# Patient Record
Sex: FEMALE | Race: WHITE | NOT HISPANIC OR LATINO | Employment: OTHER | URBAN - METROPOLITAN AREA
[De-identification: names, ages, dates, MRNs, and addresses within clinical notes are randomized per-mention and may not be internally consistent; named-entity substitution may affect disease eponyms.]

---

## 2020-11-13 ENCOUNTER — APPOINTMENT (OUTPATIENT)
Dept: PREADMISSION TESTING | Facility: HOSPITAL | Age: 40
End: 2020-11-13

## 2020-11-13 VITALS — BODY MASS INDEX: 46.14 KG/M2 | WEIGHT: 235.01 LBS | HEIGHT: 60 IN

## 2020-11-13 LAB
DEPRECATED RDW RBC AUTO: 52.4 FL (ref 37–54)
ERYTHROCYTE [DISTWIDTH] IN BLOOD BY AUTOMATED COUNT: 14.6 % (ref 12.3–15.4)
HCT VFR BLD AUTO: 40.8 % (ref 34–46.6)
HGB BLD-MCNC: 13.2 G/DL (ref 12–15.9)
MCH RBC QN AUTO: 31.6 PG (ref 26.6–33)
MCHC RBC AUTO-ENTMCNC: 32.4 G/DL (ref 31.5–35.7)
MCV RBC AUTO: 97.6 FL (ref 79–97)
MRSA DNA SPEC QL NAA+PROBE: NEGATIVE
PLATELET # BLD AUTO: 424 10*3/MM3 (ref 140–450)
PMV BLD AUTO: 10 FL (ref 6–12)
RBC # BLD AUTO: 4.18 10*6/MM3 (ref 3.77–5.28)
SARS-COV-2 RNA RESP QL NAA+PROBE: NOT DETECTED
WBC # BLD AUTO: 11.83 10*3/MM3 (ref 3.4–10.8)

## 2020-11-13 PROCEDURE — 87641 MR-STAPH DNA AMP PROBE: CPT

## 2020-11-13 PROCEDURE — 85027 COMPLETE CBC AUTOMATED: CPT

## 2020-11-13 PROCEDURE — C9803 HOPD COVID-19 SPEC COLLECT: HCPCS

## 2020-11-13 PROCEDURE — 36415 COLL VENOUS BLD VENIPUNCTURE: CPT

## 2020-11-13 PROCEDURE — U0004 COV-19 TEST NON-CDC HGH THRU: HCPCS

## 2020-11-13 RX ORDER — DOCUSATE SODIUM 100 MG/1
300 CAPSULE, LIQUID FILLED ORAL DAILY
COMMUNITY
End: 2022-04-28

## 2020-11-13 RX ORDER — PROMETHAZINE HYDROCHLORIDE 25 MG/1
25 TABLET ORAL 2 TIMES DAILY PRN
COMMUNITY

## 2020-11-13 RX ORDER — OMEPRAZOLE 40 MG/1
40 CAPSULE, DELAYED RELEASE ORAL DAILY
COMMUNITY

## 2020-11-13 RX ORDER — TOPIRAMATE 200 MG/1
2 CAPSULE, EXTENDED RELEASE ORAL DAILY
COMMUNITY
End: 2022-04-28

## 2020-11-13 RX ORDER — FLUOXETINE HYDROCHLORIDE 20 MG/1
60 CAPSULE ORAL DAILY
COMMUNITY
End: 2022-04-28

## 2020-11-13 RX ORDER — BUSPIRONE HYDROCHLORIDE 30 MG/1
30 TABLET ORAL 2 TIMES DAILY
COMMUNITY
End: 2022-12-07

## 2020-11-13 RX ORDER — KETOROLAC TROMETHAMINE 30 MG/ML
30 INJECTION, SOLUTION INTRAMUSCULAR; INTRAVENOUS AS NEEDED
Status: ON HOLD | COMMUNITY
End: 2020-11-16

## 2020-11-13 RX ORDER — ONDANSETRON HYDROCHLORIDE 4 MG/5ML
4 SOLUTION ORAL 2 TIMES WEEKLY
COMMUNITY

## 2020-11-13 RX ORDER — PROPRANOLOL HYDROCHLORIDE 40 MG/1
80 TABLET ORAL 3 TIMES DAILY
COMMUNITY

## 2020-11-13 RX ORDER — LEVOMEFOLATE CALCIUM 15 MG
15 TABLET ORAL DAILY
COMMUNITY

## 2020-11-13 RX ORDER — DIVALPROEX SODIUM 250 MG/1
250 TABLET, EXTENDED RELEASE ORAL NIGHTLY PRN
COMMUNITY
End: 2022-12-07

## 2020-11-13 RX ORDER — PROCHLORPERAZINE MALEATE 10 MG
10 TABLET ORAL EVERY 8 HOURS PRN
COMMUNITY

## 2020-11-13 RX ORDER — CYCLOBENZAPRINE HCL 10 MG
10 TABLET ORAL DAILY PRN
COMMUNITY
End: 2022-12-07

## 2020-11-13 RX ORDER — LAMOTRIGINE 200 MG/1
200 TABLET ORAL DAILY
COMMUNITY
End: 2022-12-07

## 2020-11-13 RX ORDER — PALIPERIDONE 6 MG/1
6 TABLET, EXTENDED RELEASE ORAL EVERY EVENING
COMMUNITY

## 2020-11-15 ENCOUNTER — ANESTHESIA EVENT (OUTPATIENT)
Dept: PERIOP | Facility: HOSPITAL | Age: 40
End: 2020-11-15

## 2020-11-15 RX ORDER — FAMOTIDINE 10 MG/ML
20 INJECTION, SOLUTION INTRAVENOUS ONCE
Status: CANCELLED | OUTPATIENT
Start: 2020-11-15 | End: 2020-11-15

## 2020-11-15 RX ORDER — SODIUM CHLORIDE 0.9 % (FLUSH) 0.9 %
10 SYRINGE (ML) INJECTION AS NEEDED
Status: CANCELLED | OUTPATIENT
Start: 2020-11-15

## 2020-11-15 RX ORDER — SODIUM CHLORIDE 0.9 % (FLUSH) 0.9 %
10 SYRINGE (ML) INJECTION EVERY 12 HOURS SCHEDULED
Status: CANCELLED | OUTPATIENT
Start: 2020-11-15

## 2020-11-16 ENCOUNTER — HOSPITAL ENCOUNTER (OUTPATIENT)
Facility: HOSPITAL | Age: 40
Discharge: HOME | End: 2020-11-17
Attending: ORTHOPAEDIC SURGERY | Admitting: ORTHOPAEDIC SURGERY

## 2020-11-16 ENCOUNTER — APPOINTMENT (OUTPATIENT)
Dept: GENERAL RADIOLOGY | Facility: HOSPITAL | Age: 40
End: 2020-11-16

## 2020-11-16 ENCOUNTER — ANESTHESIA (OUTPATIENT)
Dept: PERIOP | Facility: HOSPITAL | Age: 40
End: 2020-11-16

## 2020-11-16 DIAGNOSIS — M48.02 CERVICAL STENOSIS OF SPINAL CANAL: Primary | ICD-10-CM

## 2020-11-16 PROBLEM — E66.9 OBESITY: Status: ACTIVE | Noted: 2020-11-16

## 2020-11-16 PROBLEM — G43.909 MIGRAINES: Status: ACTIVE | Noted: 2020-11-16

## 2020-11-16 PROBLEM — F41.9 ANXIETY AND DEPRESSION: Status: ACTIVE | Noted: 2020-11-16

## 2020-11-16 PROBLEM — F32.A ANXIETY AND DEPRESSION: Status: ACTIVE | Noted: 2020-11-16

## 2020-11-16 LAB
B-HCG UR QL: NEGATIVE
INTERNAL NEGATIVE CONTROL: NEGATIVE
INTERNAL POSITIVE CONTROL: POSITIVE
Lab: NORMAL

## 2020-11-16 PROCEDURE — 72020 X-RAY EXAM OF SPINE 1 VIEW: CPT

## 2020-11-16 PROCEDURE — 63710000001 DEXAMETHASONE PER 0.25 MG: Performed by: ORTHOPAEDIC SURGERY

## 2020-11-16 PROCEDURE — C1713 ANCHOR/SCREW BN/BN,TIS/BN: HCPCS | Performed by: ORTHOPAEDIC SURGERY

## 2020-11-16 PROCEDURE — 25010000002 FENTANYL CITRATE (PF) 100 MCG/2ML SOLUTION: Performed by: NURSE ANESTHETIST, CERTIFIED REGISTERED

## 2020-11-16 PROCEDURE — 25010000002 DEXAMETHASONE PER 1 MG: Performed by: NURSE ANESTHETIST, CERTIFIED REGISTERED

## 2020-11-16 PROCEDURE — 25010000002 ONDANSETRON PER 1 MG: Performed by: NURSE ANESTHETIST, CERTIFIED REGISTERED

## 2020-11-16 PROCEDURE — 25010000003 CEFAZOLIN IN DEXTROSE 2-4 GM/100ML-% SOLUTION: Performed by: ORTHOPAEDIC SURGERY

## 2020-11-16 PROCEDURE — 25010000002 NEOSTIGMINE 10 MG/10ML SOLUTION: Performed by: NURSE ANESTHETIST, CERTIFIED REGISTERED

## 2020-11-16 PROCEDURE — 81025 URINE PREGNANCY TEST: CPT | Performed by: ORTHOPAEDIC SURGERY

## 2020-11-16 PROCEDURE — 25010000002 HYDROMORPHONE 1 MG/ML SOLUTION: Performed by: ORTHOPAEDIC SURGERY

## 2020-11-16 PROCEDURE — 25010000002 MIDAZOLAM PER 1 MG: Performed by: ANESTHESIOLOGY

## 2020-11-16 PROCEDURE — 25010000002 PROPOFOL 10 MG/ML EMULSION: Performed by: NURSE ANESTHETIST, CERTIFIED REGISTERED

## 2020-11-16 PROCEDURE — 94799 UNLISTED PULMONARY SVC/PX: CPT

## 2020-11-16 PROCEDURE — L0120 CERV FLEX N/ADJ FOAM PRE OTS: HCPCS | Performed by: ORTHOPAEDIC SURGERY

## 2020-11-16 PROCEDURE — 25010000002 HYDROMORPHONE PER 4 MG: Performed by: NURSE ANESTHETIST, CERTIFIED REGISTERED

## 2020-11-16 PROCEDURE — 25810000003 SODIUM CHLORIDE 0.9 % WITH KCL 20 MEQ 20-0.9 MEQ/L-% SOLUTION: Performed by: ORTHOPAEDIC SURGERY

## 2020-11-16 DEVICE — ALLOGRFT BONE VIVIGEN CELLUAR MATRX FORMABLE 1CC: Type: IMPLANTABLE DEVICE | Site: SPINE CERVICAL | Status: FUNCTIONAL

## 2020-11-16 DEVICE — SCRW SKYLINE VAR SD 14MM: Type: IMPLANTABLE DEVICE | Site: SPINE CERVICAL | Status: FUNCTIONAL

## 2020-11-16 DEVICE — HEMOST ABS SURGIFOAM SZ100 8X12 10MM: Type: IMPLANTABLE DEVICE | Site: SPINE CERVICAL | Status: FUNCTIONAL

## 2020-11-16 DEVICE — PLT SKYLINE 1LEVEL 14MM: Type: IMPLANTABLE DEVICE | Site: SPINE CERVICAL | Status: FUNCTIONAL

## 2020-11-16 DEVICE — BENGAL SYSTEM STANDARD IMPLANT 6MM, 7 DEGREES
Type: IMPLANTABLE DEVICE | Site: SPINE CERVICAL | Status: FUNCTIONAL
Brand: BENGAL

## 2020-11-16 RX ORDER — PANTOPRAZOLE SODIUM 40 MG/1
40 TABLET, DELAYED RELEASE ORAL
Status: DISCONTINUED | OUTPATIENT
Start: 2020-11-17 | End: 2020-11-17 | Stop reason: HOSPADM

## 2020-11-16 RX ORDER — ACETAMINOPHEN 325 MG/1
650 TABLET ORAL EVERY 4 HOURS PRN
Status: DISCONTINUED | OUTPATIENT
Start: 2020-11-16 | End: 2020-11-17 | Stop reason: HOSPADM

## 2020-11-16 RX ORDER — FENTANYL CITRATE 50 UG/ML
50 INJECTION, SOLUTION INTRAMUSCULAR; INTRAVENOUS
Status: DISCONTINUED | OUTPATIENT
Start: 2020-11-16 | End: 2020-11-16 | Stop reason: HOSPADM

## 2020-11-16 RX ORDER — ONDANSETRON 2 MG/ML
4 INJECTION INTRAMUSCULAR; INTRAVENOUS EVERY 6 HOURS PRN
Status: DISCONTINUED | OUTPATIENT
Start: 2020-11-16 | End: 2020-11-17 | Stop reason: HOSPADM

## 2020-11-16 RX ORDER — CEFAZOLIN SODIUM 2 G/100ML
2 INJECTION, SOLUTION INTRAVENOUS ONCE
Status: COMPLETED | OUTPATIENT
Start: 2020-11-16 | End: 2020-11-16

## 2020-11-16 RX ORDER — LIDOCAINE HYDROCHLORIDE 10 MG/ML
0.5 INJECTION, SOLUTION EPIDURAL; INFILTRATION; INTRACAUDAL; PERINEURAL ONCE AS NEEDED
Status: COMPLETED | OUTPATIENT
Start: 2020-11-16 | End: 2020-11-16

## 2020-11-16 RX ORDER — SODIUM CHLORIDE 0.9 % (FLUSH) 0.9 %
10 SYRINGE (ML) INJECTION AS NEEDED
Status: DISCONTINUED | OUTPATIENT
Start: 2020-11-16 | End: 2020-11-17 | Stop reason: HOSPADM

## 2020-11-16 RX ORDER — ROCURONIUM BROMIDE 10 MG/ML
INJECTION, SOLUTION INTRAVENOUS AS NEEDED
Status: DISCONTINUED | OUTPATIENT
Start: 2020-11-16 | End: 2020-11-16 | Stop reason: SURG

## 2020-11-16 RX ORDER — FLUOXETINE HYDROCHLORIDE 20 MG/1
60 CAPSULE ORAL DAILY
Status: DISCONTINUED | OUTPATIENT
Start: 2020-11-16 | End: 2020-11-17 | Stop reason: HOSPADM

## 2020-11-16 RX ORDER — ALBUTEROL SULFATE 2.5 MG/3ML
2.5 SOLUTION RESPIRATORY (INHALATION) ONCE AS NEEDED
Status: DISCONTINUED | OUTPATIENT
Start: 2020-11-16 | End: 2020-11-16 | Stop reason: HOSPADM

## 2020-11-16 RX ORDER — MIDAZOLAM HYDROCHLORIDE 1 MG/ML
1 INJECTION INTRAMUSCULAR; INTRAVENOUS
Status: DISCONTINUED | OUTPATIENT
Start: 2020-11-16 | End: 2020-11-16

## 2020-11-16 RX ORDER — ONDANSETRON 2 MG/ML
4 INJECTION INTRAMUSCULAR; INTRAVENOUS ONCE AS NEEDED
Status: DISCONTINUED | OUTPATIENT
Start: 2020-11-16 | End: 2020-11-16 | Stop reason: HOSPADM

## 2020-11-16 RX ORDER — LABETALOL HYDROCHLORIDE 5 MG/ML
5 INJECTION, SOLUTION INTRAVENOUS
Status: DISCONTINUED | OUTPATIENT
Start: 2020-11-16 | End: 2020-11-16 | Stop reason: HOSPADM

## 2020-11-16 RX ORDER — GLYCOPYRROLATE 0.2 MG/ML
INJECTION INTRAMUSCULAR; INTRAVENOUS AS NEEDED
Status: DISCONTINUED | OUTPATIENT
Start: 2020-11-16 | End: 2020-11-16 | Stop reason: SURG

## 2020-11-16 RX ORDER — SODIUM CHLORIDE AND POTASSIUM CHLORIDE 150; 900 MG/100ML; MG/100ML
100 INJECTION, SOLUTION INTRAVENOUS CONTINUOUS
Status: DISCONTINUED | OUTPATIENT
Start: 2020-11-16 | End: 2020-11-17 | Stop reason: HOSPADM

## 2020-11-16 RX ORDER — ALPRAZOLAM 0.25 MG/1
0.25 TABLET ORAL NIGHTLY PRN
Status: DISCONTINUED | OUTPATIENT
Start: 2020-11-16 | End: 2020-11-17 | Stop reason: HOSPADM

## 2020-11-16 RX ORDER — DIHYDROERGOTAMINE MESYLATE 1 MG/ML
1 INJECTION, SOLUTION INTRAMUSCULAR; INTRAVENOUS; SUBCUTANEOUS EVERY 8 HOURS PRN
COMMUNITY

## 2020-11-16 RX ORDER — PROMETHAZINE HYDROCHLORIDE 25 MG/1
25 TABLET ORAL 2 TIMES DAILY PRN
Status: DISCONTINUED | OUTPATIENT
Start: 2020-11-16 | End: 2020-11-17 | Stop reason: HOSPADM

## 2020-11-16 RX ORDER — LIDOCAINE HYDROCHLORIDE 10 MG/ML
INJECTION, SOLUTION EPIDURAL; INFILTRATION; INTRACAUDAL; PERINEURAL AS NEEDED
Status: DISCONTINUED | OUTPATIENT
Start: 2020-11-16 | End: 2020-11-16 | Stop reason: SURG

## 2020-11-16 RX ORDER — PROPOFOL 10 MG/ML
VIAL (ML) INTRAVENOUS AS NEEDED
Status: DISCONTINUED | OUTPATIENT
Start: 2020-11-16 | End: 2020-11-16 | Stop reason: SURG

## 2020-11-16 RX ORDER — FLUORIDE TOOTHPASTE
15 TOOTHPASTE DENTAL
Status: DISCONTINUED | OUTPATIENT
Start: 2020-11-16 | End: 2020-11-17 | Stop reason: HOSPADM

## 2020-11-16 RX ORDER — FENTANYL CITRATE 50 UG/ML
INJECTION, SOLUTION INTRAMUSCULAR; INTRAVENOUS AS NEEDED
Status: DISCONTINUED | OUTPATIENT
Start: 2020-11-16 | End: 2020-11-16 | Stop reason: SURG

## 2020-11-16 RX ORDER — HYDROMORPHONE HYDROCHLORIDE 1 MG/ML
0.5 INJECTION, SOLUTION INTRAMUSCULAR; INTRAVENOUS; SUBCUTANEOUS
Status: DISCONTINUED | OUTPATIENT
Start: 2020-11-16 | End: 2020-11-16 | Stop reason: HOSPADM

## 2020-11-16 RX ORDER — OXYCODONE AND ACETAMINOPHEN 10; 325 MG/1; MG/1
1 TABLET ORAL EVERY 4 HOURS PRN
Status: DISCONTINUED | OUTPATIENT
Start: 2020-11-16 | End: 2020-11-17 | Stop reason: HOSPADM

## 2020-11-16 RX ORDER — DEXAMETHASONE 4 MG/1
4 TABLET ORAL EVERY 6 HOURS SCHEDULED
Status: DISCONTINUED | OUTPATIENT
Start: 2020-11-16 | End: 2020-11-17 | Stop reason: HOSPADM

## 2020-11-16 RX ORDER — FAMOTIDINE 20 MG/1
20 TABLET, FILM COATED ORAL ONCE
Status: COMPLETED | OUTPATIENT
Start: 2020-11-16 | End: 2020-11-16

## 2020-11-16 RX ORDER — PROCHLORPERAZINE MALEATE 5 MG/1
10 TABLET ORAL EVERY 8 HOURS PRN
Status: DISCONTINUED | OUTPATIENT
Start: 2020-11-16 | End: 2020-11-17 | Stop reason: HOSPADM

## 2020-11-16 RX ORDER — PROPRANOLOL HYDROCHLORIDE 20 MG/1
80 TABLET ORAL 3 TIMES DAILY
Status: DISCONTINUED | OUTPATIENT
Start: 2020-11-16 | End: 2020-11-17 | Stop reason: HOSPADM

## 2020-11-16 RX ORDER — DEXAMETHASONE SODIUM PHOSPHATE 4 MG/ML
4 INJECTION, SOLUTION INTRA-ARTICULAR; INTRALESIONAL; INTRAMUSCULAR; INTRAVENOUS; SOFT TISSUE EVERY 6 HOURS SCHEDULED
Status: DISCONTINUED | OUTPATIENT
Start: 2020-11-16 | End: 2020-11-17 | Stop reason: HOSPADM

## 2020-11-16 RX ORDER — SODIUM CHLORIDE 0.9 % (FLUSH) 0.9 %
3 SYRINGE (ML) INJECTION EVERY 12 HOURS SCHEDULED
Status: DISCONTINUED | OUTPATIENT
Start: 2020-11-16 | End: 2020-11-17 | Stop reason: HOSPADM

## 2020-11-16 RX ORDER — CYCLOBENZAPRINE HCL 10 MG
5 TABLET ORAL DAILY PRN
Status: DISCONTINUED | OUTPATIENT
Start: 2020-11-16 | End: 2020-11-17 | Stop reason: HOSPADM

## 2020-11-16 RX ORDER — OXYCODONE HCL 10 MG/1
10 TABLET, FILM COATED, EXTENDED RELEASE ORAL ONCE
Status: COMPLETED | OUTPATIENT
Start: 2020-11-16 | End: 2020-11-16

## 2020-11-16 RX ORDER — NEOSTIGMINE METHYLSULFATE 1 MG/ML
INJECTION, SOLUTION INTRAVENOUS AS NEEDED
Status: DISCONTINUED | OUTPATIENT
Start: 2020-11-16 | End: 2020-11-16 | Stop reason: SURG

## 2020-11-16 RX ORDER — RISPERIDONE 1 MG/1
0.5 TABLET ORAL NIGHTLY
Status: DISCONTINUED | OUTPATIENT
Start: 2020-11-16 | End: 2020-11-17 | Stop reason: HOSPADM

## 2020-11-16 RX ORDER — DOCUSATE SODIUM 100 MG/1
100 CAPSULE, LIQUID FILLED ORAL 2 TIMES DAILY
Status: DISCONTINUED | OUTPATIENT
Start: 2020-11-16 | End: 2020-11-17 | Stop reason: HOSPADM

## 2020-11-16 RX ORDER — DEXAMETHASONE SODIUM PHOSPHATE 4 MG/ML
INJECTION, SOLUTION INTRA-ARTICULAR; INTRALESIONAL; INTRAMUSCULAR; INTRAVENOUS; SOFT TISSUE AS NEEDED
Status: DISCONTINUED | OUTPATIENT
Start: 2020-11-16 | End: 2020-11-16 | Stop reason: SURG

## 2020-11-16 RX ORDER — LABETALOL HYDROCHLORIDE 5 MG/ML
10 INJECTION, SOLUTION INTRAVENOUS EVERY 4 HOURS PRN
Status: DISCONTINUED | OUTPATIENT
Start: 2020-11-16 | End: 2020-11-17 | Stop reason: HOSPADM

## 2020-11-16 RX ORDER — NALOXONE HCL 0.4 MG/ML
0.4 VIAL (ML) INJECTION
Status: DISCONTINUED | OUTPATIENT
Start: 2020-11-16 | End: 2020-11-17 | Stop reason: HOSPADM

## 2020-11-16 RX ORDER — PREGABALIN 75 MG/1
75 CAPSULE ORAL ONCE
Status: COMPLETED | OUTPATIENT
Start: 2020-11-16 | End: 2020-11-16

## 2020-11-16 RX ORDER — FAMOTIDINE 20 MG/1
20 TABLET, FILM COATED ORAL EVERY 12 HOURS SCHEDULED
Status: DISCONTINUED | OUTPATIENT
Start: 2020-11-16 | End: 2020-11-17 | Stop reason: HOSPADM

## 2020-11-16 RX ORDER — LAMOTRIGINE 100 MG/1
200 TABLET ORAL DAILY
Status: DISCONTINUED | OUTPATIENT
Start: 2020-11-16 | End: 2020-11-17 | Stop reason: HOSPADM

## 2020-11-16 RX ORDER — SODIUM CHLORIDE, SODIUM LACTATE, POTASSIUM CHLORIDE, CALCIUM CHLORIDE 600; 310; 30; 20 MG/100ML; MG/100ML; MG/100ML; MG/100ML
9 INJECTION, SOLUTION INTRAVENOUS CONTINUOUS
Status: DISCONTINUED | OUTPATIENT
Start: 2020-11-16 | End: 2020-11-17 | Stop reason: HOSPADM

## 2020-11-16 RX ORDER — MIDAZOLAM HYDROCHLORIDE 1 MG/ML
2 INJECTION INTRAMUSCULAR; INTRAVENOUS ONCE
Status: COMPLETED | OUTPATIENT
Start: 2020-11-16 | End: 2020-11-16

## 2020-11-16 RX ORDER — ONDANSETRON 2 MG/ML
INJECTION INTRAMUSCULAR; INTRAVENOUS AS NEEDED
Status: DISCONTINUED | OUTPATIENT
Start: 2020-11-16 | End: 2020-11-16 | Stop reason: SURG

## 2020-11-16 RX ORDER — SCOLOPAMINE TRANSDERMAL SYSTEM 1 MG/1
1 PATCH, EXTENDED RELEASE TRANSDERMAL CONTINUOUS
Status: ACTIVE | OUTPATIENT
Start: 2020-11-16 | End: 2020-11-17

## 2020-11-16 RX ORDER — ACETAMINOPHEN 500 MG
1000 TABLET ORAL ONCE
Status: COMPLETED | OUTPATIENT
Start: 2020-11-16 | End: 2020-11-16

## 2020-11-16 RX ORDER — HYDROCODONE BITARTRATE AND ACETAMINOPHEN 10; 325 MG/1; MG/1
1 TABLET ORAL EVERY 4 HOURS PRN
Status: DISCONTINUED | OUTPATIENT
Start: 2020-11-16 | End: 2020-11-17 | Stop reason: HOSPADM

## 2020-11-16 RX ORDER — BUSPIRONE HYDROCHLORIDE 10 MG/1
30 TABLET ORAL 2 TIMES DAILY
Status: DISCONTINUED | OUTPATIENT
Start: 2020-11-16 | End: 2020-11-17 | Stop reason: HOSPADM

## 2020-11-16 RX ORDER — TOPIRAMATE 100 MG/1
200 TABLET, FILM COATED ORAL EVERY 12 HOURS SCHEDULED
Status: DISCONTINUED | OUTPATIENT
Start: 2020-11-16 | End: 2020-11-17 | Stop reason: HOSPADM

## 2020-11-16 RX ADMIN — PROPRANOLOL HYDROCHLORIDE 80 MG: 20 TABLET ORAL at 21:25

## 2020-11-16 RX ADMIN — SODIUM CHLORIDE, PRESERVATIVE FREE 3 ML: 5 INJECTION INTRAVENOUS at 21:28

## 2020-11-16 RX ADMIN — DEXAMETHASONE SODIUM PHOSPHATE 8 MG: 4 INJECTION, SOLUTION INTRAMUSCULAR; INTRAVENOUS at 07:50

## 2020-11-16 RX ADMIN — PREGABALIN 75 MG: 75 CAPSULE ORAL at 06:50

## 2020-11-16 RX ADMIN — HYDROMORPHONE HYDROCHLORIDE 0.5 MG: 1 INJECTION, SOLUTION INTRAMUSCULAR; INTRAVENOUS; SUBCUTANEOUS at 10:13

## 2020-11-16 RX ADMIN — RISPERIDONE 0.5 MG: 1 TABLET ORAL at 21:25

## 2020-11-16 RX ADMIN — CEFAZOLIN SODIUM 2 G: 2 INJECTION, SOLUTION INTRAVENOUS at 07:50

## 2020-11-16 RX ADMIN — SODIUM CHLORIDE, POTASSIUM CHLORIDE, SODIUM LACTATE AND CALCIUM CHLORIDE: 600; 310; 30; 20 INJECTION, SOLUTION INTRAVENOUS at 09:28

## 2020-11-16 RX ADMIN — PROPOFOL 200 MG: 10 INJECTION, EMULSION INTRAVENOUS at 07:39

## 2020-11-16 RX ADMIN — FENTANYL CITRATE 50 MCG: 0.05 INJECTION, SOLUTION INTRAMUSCULAR; INTRAVENOUS at 10:03

## 2020-11-16 RX ADMIN — BUSPIRONE HYDROCHLORIDE 30 MG: 10 TABLET ORAL at 11:44

## 2020-11-16 RX ADMIN — SODIUM CHLORIDE, POTASSIUM CHLORIDE, SODIUM LACTATE AND CALCIUM CHLORIDE 9 ML/HR: 600; 310; 30; 20 INJECTION, SOLUTION INTRAVENOUS at 06:49

## 2020-11-16 RX ADMIN — PROPRANOLOL HYDROCHLORIDE 80 MG: 20 TABLET ORAL at 16:10

## 2020-11-16 RX ADMIN — ACETAMINOPHEN 1000 MG: 500 TABLET ORAL at 06:50

## 2020-11-16 RX ADMIN — ROCURONIUM BROMIDE 50 MG: 10 INJECTION INTRAVENOUS at 07:39

## 2020-11-16 RX ADMIN — HYDROCODONE BITARTRATE AND ACETAMINOPHEN 1 TABLET: 10; 325 TABLET ORAL at 14:43

## 2020-11-16 RX ADMIN — POTASSIUM CHLORIDE AND SODIUM CHLORIDE 100 ML/HR: 900; 150 INJECTION, SOLUTION INTRAVENOUS at 10:32

## 2020-11-16 RX ADMIN — FAMOTIDINE 20 MG: 20 TABLET, FILM COATED ORAL at 06:50

## 2020-11-16 RX ADMIN — OXYCODONE HYDROCHLORIDE 10 MG: 10 TABLET, FILM COATED, EXTENDED RELEASE ORAL at 06:50

## 2020-11-16 RX ADMIN — HYDROCODONE BITARTRATE AND ACETAMINOPHEN 1 TABLET: 10; 325 TABLET ORAL at 19:31

## 2020-11-16 RX ADMIN — MIDAZOLAM 2 MG: 1 INJECTION INTRAMUSCULAR; INTRAVENOUS at 07:20

## 2020-11-16 RX ADMIN — GLYCOPYRROLATE 0.4 MG: 0.2 INJECTION INTRAMUSCULAR; INTRAVENOUS at 09:18

## 2020-11-16 RX ADMIN — SCOPALAMINE 1 PATCH: 1 PATCH, EXTENDED RELEASE TRANSDERMAL at 07:20

## 2020-11-16 RX ADMIN — TOPIRAMATE 200 MG: 100 TABLET, FILM COATED ORAL at 14:41

## 2020-11-16 RX ADMIN — OXYCODONE HYDROCHLORIDE AND ACETAMINOPHEN 1 TABLET: 10; 325 TABLET ORAL at 10:55

## 2020-11-16 RX ADMIN — ONDANSETRON 4 MG: 2 INJECTION INTRAMUSCULAR; INTRAVENOUS at 09:18

## 2020-11-16 RX ADMIN — FAMOTIDINE 20 MG: 20 TABLET, FILM COATED ORAL at 21:25

## 2020-11-16 RX ADMIN — LAMOTRIGINE 200 MG: 100 TABLET ORAL at 11:44

## 2020-11-16 RX ADMIN — HYDROMORPHONE HYDROCHLORIDE 1 MG: 1 INJECTION, SOLUTION INTRAMUSCULAR; INTRAVENOUS; SUBCUTANEOUS at 15:55

## 2020-11-16 RX ADMIN — FENTANYL CITRATE 100 MCG: 50 INJECTION, SOLUTION INTRAMUSCULAR; INTRAVENOUS at 07:39

## 2020-11-16 RX ADMIN — FLUOXETINE HYDROCHLORIDE 60 MG: 20 CAPSULE ORAL at 11:44

## 2020-11-16 RX ADMIN — LIDOCAINE HYDROCHLORIDE 50 MG: 10 INJECTION, SOLUTION EPIDURAL; INFILTRATION; INTRACAUDAL; PERINEURAL at 07:39

## 2020-11-16 RX ADMIN — DEXAMETHASONE 4 MG: 4 TABLET ORAL at 23:26

## 2020-11-16 RX ADMIN — MUPIROCIN: 20 OINTMENT TOPICAL at 06:50

## 2020-11-16 RX ADMIN — TOPIRAMATE 200 MG: 100 TABLET, FILM COATED ORAL at 21:26

## 2020-11-16 RX ADMIN — NEOSTIGMINE 3 MG: 1 INJECTION INTRAVENOUS at 09:18

## 2020-11-16 RX ADMIN — FENTANYL CITRATE 50 MCG: 0.05 INJECTION, SOLUTION INTRAMUSCULAR; INTRAVENOUS at 09:55

## 2020-11-16 RX ADMIN — BUSPIRONE HYDROCHLORIDE 30 MG: 10 TABLET ORAL at 21:24

## 2020-11-16 RX ADMIN — DOCUSATE SODIUM 100 MG: 100 CAPSULE ORAL at 11:44

## 2020-11-16 RX ADMIN — LIDOCAINE HYDROCHLORIDE 0.2 ML: 10 INJECTION, SOLUTION EPIDURAL; INFILTRATION; INTRACAUDAL; PERINEURAL at 06:49

## 2020-11-16 RX ADMIN — DEXAMETHASONE 4 MG: 4 TABLET ORAL at 17:41

## 2020-11-16 RX ADMIN — DOCUSATE SODIUM 100 MG: 100 CAPSULE ORAL at 21:24

## 2020-11-16 RX ADMIN — OXYCODONE HYDROCHLORIDE AND ACETAMINOPHEN 1 TABLET: 10; 325 TABLET ORAL at 23:26

## 2020-11-16 RX ADMIN — FAMOTIDINE 20 MG: 20 TABLET, FILM COATED ORAL at 11:44

## 2020-11-16 RX ADMIN — DEXAMETHASONE 4 MG: 4 TABLET ORAL at 11:44

## 2020-11-16 NOTE — PLAN OF CARE
Goal Outcome Evaluation:  Plan of Care Reviewed With: patient  Progress: no change   Pt having pain control issues, pt has had percocet, lortab and dilaudid andc continues to complain of pain 9-10 on the left side, will continue to monitor

## 2020-11-16 NOTE — H&P
Patient Care Team:      Chief complaint neck pain, migraines    Subjective:  Patient is a 39 y.o.female who presents with several year history of migraines and neck pain. Patient states that she has been going to a migraine clinic since 2009 and they did a MRI which revealed cervical spinal stenosis, patient was told her stenosis may be contributing to her migraines. She states that she has pain in her neck which radiates to her right shoulder. Patient denies numbness, tingling or loss of function in her bilateral upper extremities. Patient denies any recent changes to her overall health.     Review of Systems:  General ROS: negative  Cardiovascular ROS: no chest pain or dyspnea on exertion  Respiratory ROS: no cough, shortness of breath, or wheezing      Allergies: No Known Allergies       Latex: negative  Contrast Dye: negative    Home Meds    Medications Prior to Admission   Medication Sig Dispense Refill Last Dose   • busPIRone (BUSPAR) 30 MG tablet Take 30 mg by mouth 2 (Two) Times a Day.   11/15/2020 at Unknown time   • cyclobenzaprine (FLEXERIL) 10 MG tablet Take 10 mg by mouth Daily As Needed for Muscle Spasms. 1/2 TO 1 TABLET AS NEEDED FOR NECK PAIN   11/15/2020 at Unknown time   • docusate sodium (COLACE) 100 MG capsule Take 300 mg by mouth Daily.   11/15/2020 at Unknown time   • FLUoxetine (PROzac) 20 MG capsule Take 60 mg by mouth Daily.   11/15/2020 at Unknown time   • l-methylfolate 15 MG tablet tablet Take 15 mg by mouth Daily.   11/15/2020 at Unknown time   • lamoTRIgine (LaMICtal) 200 MG tablet Take 200 mg by mouth Daily.   11/15/2020 at Unknown time   • omeprazole (priLOSEC) 40 MG capsule Take 40 mg by mouth Daily.   11/15/2020 at Unknown time   • paliperidone (INVEGA) 6 MG 24 hr tablet Take 6 mg by mouth Every Evening.   11/15/2020 at Unknown time   • prochlorperazine (COMPAZINE) 10 MG tablet Take 10 mg by mouth Every 8 (Eight) Hours As Needed for Nausea or Vomiting (HA).   11/15/2020 at Unknown  time   • propranolol (INDERAL) 40 MG tablet Take 80 mg by mouth 3 (Three) Times a Day.   11/15/2020 at 1900   • Topiramate ER (Trokendi XR) 200 MG capsule sustained-release 24 hr Take 2 capsules by mouth Daily.   11/15/2020 at Unknown time   • dihydroergotamine (D.H.E. 45) 1 MG/ML injection Infuse 1 mg into a venous catheter Every 8 (Eight) Hours As Needed for Migraine.   More than a month at Unknown time   • dihydroergotamine in sodium chloride 0.9 % 50 mL IVPB Infuse 1 mg into a venous catheter As Needed.   11/2/2020   • diphenhydrAMINE (BENADRYL) Infuse 25 mg into a venous catheter 2 (Two) Times a Week.   11/2/2020   • divalproex (DEPAKOTE) 250 MG 24 hr tablet Take 250 mg by mouth At Night As Needed (HA).   11/2/2020   • ketorolac (TORADOL) 30 MG/ML injection Infuse 30 mg into a venous catheter As Needed for Moderate Pain .   11/2/2020   • OnabotulinumtoxinA (BOTOX IJ) Inject 1 each as directed Every 3 (Three) Months.   8/11/2020   • ondansetron (ZOFRAN) 4 MG/5ML solution Take 4 mg by mouth 2 (Two) Times a Week.   More than a month at Unknown time   • promethazine (PHENERGAN) 25 MG tablet Take 25 mg by mouth 2 (Two) Times a Day As Needed for Nausea or Vomiting.   11/13/2020   • promethazine (PHENERGAN) Infuse 12.5 mg into a venous catheter 2 (Two) Times a Week.   11/2/2020   • valproate in sodium chloride 0.9 % 100 mL IVPB Infuse 500 mg into a venous catheter 2 (Two) Times a Week.   11/2/2020     PMH:   Past Medical History:   Diagnosis Date   • Anxiety and depression    • Constipation    • Contact lens/glasses fitting    • Migraines    • PONV (postoperative nausea and vomiting)    • Spinal headache      PSH:    Past Surgical History:   Procedure Laterality Date   • CHOLECYSTECTOMY     • TIBIA FRACTURE SURGERY      HARDWARE IN ANKLE     Immunization History: pneumo negative   Flu negative  Tetanus up to date  Social History:   Tobacco negative   Alcohol negative       Physical Exam:/70 (BP Location: Right  arm, Patient Position: Lying)   Pulse 79   Temp 98.6 °F (37 °C) (Tympanic)   Resp 18   SpO2 97%   Breastfeeding No Comment: neg HcG      General Appearance:    Alert, cooperative, no distress, appears stated age   Head:    Normocephalic, without obvious abnormality, atraumatic   Lungs:     Clear to auscultation bilaterally, respirations unlabored    Heart:   Regular rate and rhythm, S1 and S2 normal, no murmur, rub    or gallop    Abdomen:    Soft without tenderness   Breast Exam:    deferred   Genitalia:    deferred   Extremities:   Extremities normal, atraumatic, no cyanosis or edema   Skin:   Skin color, texture, turgor normal, no rashes or lesions   Neurologic:   Grossly intact     Results Review:   LABS:  Lab Results   Component Value Date    WBC 11.83 (H) 11/13/2020    HGB 13.2 11/13/2020    HCT 40.8 11/13/2020    MCV 97.6 (H) 11/13/2020     11/13/2020    NEUTROABS 6.19 03/25/2015    GLUCOSE 76 03/25/2015    BUN 9 03/25/2015    CREATININE 0.8 03/25/2015     03/25/2015    K 3.4 (L) 03/25/2015     03/25/2015    CO2 23 03/25/2015    CALCIUM 9.3 03/25/2015    ALBUMIN 4.4 03/25/2015    AST 26 03/25/2015    ALT 42 (H) 03/25/2015    BILITOT 0.3 03/25/2015       RADIOLOGY:  Imaging Results (Last 72 Hours)     ** No results found for the last 72 hours. **          Impression: cervical stenosis    Plan: anterior cervical discectomy and fusion C5-6 with plate, cage and allograft   Charlene Gamino PA-C 11/16/2020 06:55 EST

## 2020-11-16 NOTE — ANESTHESIA PREPROCEDURE EVALUATION
Anesthesia Evaluation     Patient summary reviewed and Nursing notes reviewed   history of anesthetic complications: PONV  NPO Solid Status: > 8 hours  NPO Liquid Status: > 2 hours           Airway   Mallampati: II  TM distance: >3 FB  Neck ROM: full  No difficulty expected  Dental - normal exam     Pulmonary - negative pulmonary ROS and normal exam   (-) COPD, asthma, sleep apnea, not a smoker  Cardiovascular - normal exam  Exercise tolerance: good (4-7 METS)    ECG reviewed    (-) hypertension, dysrhythmias, angina, CHF, orthopnea, ORELLANA, hyperlipidemia      Neuro/Psych  (+) headaches, psychiatric history Anxiety,     (-) seizures, CVA  GI/Hepatic/Renal/Endo    (+) obesity,  GERD well controlled,  liver disease fatty liver disease,   (-) no renal disease, diabetes    Musculoskeletal (-) negative ROS    Abdominal    Substance History      OB/GYN          Other - negative ROS                       Anesthesia Plan    ASA 3     general     intravenous induction     Anesthetic plan, all risks, benefits, and alternatives have been provided, discussed and informed consent has been obtained with: patient.    Plan discussed with CRNA.

## 2020-11-16 NOTE — PROGRESS NOTES
"Ortho Spine Progress Note     LOS: 0 days   Patient Care Team:  Tato Spap MD as PCP - General (Family Medicine)    Subjective Pt with some incisional pain and posterior neck pain, typical pain after an ACDF, but perhaps more severe in her than typical. No arm pain or numbness.    Objective     Vital Signs:  /79 (BP Location: Right arm, Patient Position: Lying)   Pulse 99   Temp 98.1 °F (36.7 °C) (Oral)   Resp 18   Ht 152.4 cm (60\")   Wt 107 kg (235 lb 0.2 oz)   SpO2 96%   Breastfeeding No Comment: neg HcG  BMI 45.90 kg/m²          Labs:  Lab Results (last 24 hours)     Procedure Component Value Units Date/Time    POC Pregnancy, Urine [[id number]]  (Normal) Collected: 11/16/20 0644    Specimen: Urine Updated: 11/16/20 0644     HCG, Urine, QL Negative     Lot Number [id number]     Internal Positive Control Positive     Internal Negative Control Negative          Physical Exam:  Neurovascular intact.  Calves soft, non-tender.     Assessment/Plan   Doing OK after surgery. Typical type of pain after ACDF, just a bit more severe. Need to mobilize after dinner tonight. Anticipate home tomorrow.    Tato Riley MD  11/16/20  16:54 EST      "

## 2020-11-16 NOTE — ANESTHESIA POSTPROCEDURE EVALUATION
Patient: Leticia Farooq    Procedure Summary     Date: 11/16/20 Room / [facility]:  [facility]    Anesthesia Start: 0734 Anesthesia Stop:     Procedure: ANTERIOR CERVICAL DISCECTOMY AND FUSION C 5-6 WITH PLATE, CAGE AND ALLOGRAFT (N/A Spine Cervical) Diagnosis:     Surgeon: Tato Riley MD Provider: Elpidio Carlos Jr., MD    Anesthesia Type: general ASA Status: 3          Anesthesia Type: general    Vitals  Vitals Value Taken Time   BP     Temp     Pulse     Resp     SpO2 93 % 11/16/20 0934   Vitals shown include unvalidated device data.        Post Anesthesia Care and Evaluation    Patient location during evaluation: PACU  Patient participation: complete - patient participated  Level of consciousness: awake  Pain score: 0  Pain management: adequate  Airway patency: patent  Anesthetic complications: No anesthetic complications  PONV Status: none  Cardiovascular status: acceptable and stable  Respiratory status: nasal cannula, unassisted, acceptable and spontaneous ventilation  Hydration status: acceptable

## 2020-11-16 NOTE — H&P
Patient Name: Leticia Farooq  MRN: [id number]  : 1980  DOS: 2020    Attending: Tato Riley MD    Primary Care Provider: Tato Sapp MD      Chief complaint: Neck pain and migraines    Subjective   Patient is a pleasant 39 y.o. female presented with for scheduled surgery by Dr. Riley.   She has been diagnosed with cervical spinal stenosis and opted for surgical intervention.  She has had chronic migraines since , has been following migraine clinic, is on extensive regimen of medications for her migraines.  Today she underwent the following procedures under general anesthesia:  #1  Anterior Cervical Discectomy and Fusion of  C5-6                          #2  Interbody Cage Placed at  C5-6 (Depuy Bengal Cage which does not have an integral plate.)                          #3  Anterior Cervical Plate (independent of the cage) Spanning  C5-6   She tolerated surgery well and is admitted for further management.  Seen in her room postoperatively, doing fairly well, controlled postop pain, no nausea or vomiting.  She has not yet ambulated.    She has no history of DVT or PE.  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    Allergies:  No Known Allergies    Meds:  Medications Prior to Admission   Medication Sig Dispense Refill Last Dose   • busPIRone (BUSPAR) 30 MG tablet Take 30 mg by mouth 2 (Two) Times a Day.   11/15/2020 at Unknown time   • cyclobenzaprine (FLEXERIL) 10 MG tablet Take 10 mg by mouth Daily As Needed for Muscle Spasms. 1/2 TO 1 TABLET AS NEEDED FOR NECK PAIN   11/15/2020 at Unknown time   • docusate sodium (COLACE) 100 MG capsule Take 300 mg by mouth Daily.   11/15/2020 at Unknown time   • FLUoxetine (PROzac) 20 MG capsule Take 60 mg by mouth Daily.   11/15/2020 at Unknown time   • l-methylfolate 15 MG tablet tablet Take 15 mg by mouth Daily.   11/15/2020 at Unknown time   • lamoTRIgine (LaMICtal) 200 MG tablet Take  200 mg by mouth Daily.   11/15/2020 at Unknown time   • omeprazole (priLOSEC) 40 MG capsule Take 40 mg by mouth Daily.   11/15/2020 at Unknown time   • paliperidone (INVEGA) 6 MG 24 hr tablet Take 6 mg by mouth Every Evening.   11/15/2020 at Unknown time   • prochlorperazine (COMPAZINE) 10 MG tablet Take 10 mg by mouth Every 8 (Eight) Hours As Needed for Nausea or Vomiting (HA).   11/15/2020 at Unknown time   • propranolol (INDERAL) 40 MG tablet Take 80 mg by mouth 3 (Three) Times a Day.   11/15/2020 at 1900   • Topiramate ER (Trokendi XR) 200 MG capsule sustained-release 24 hr Take 2 capsules by mouth Daily.   11/15/2020 at Unknown time   • dihydroergotamine (D.H.E. 45) 1 MG/ML injection Infuse 1 mg into a venous catheter Every 8 (Eight) Hours As Needed for Migraine.   More than a month at Unknown time   • dihydroergotamine in sodium chloride 0.9 % 50 mL IVPB Infuse 1 mg into a venous catheter As Needed.   11/2/2020   • diphenhydrAMINE (BENADRYL) Infuse 25 mg into a venous catheter 2 (Two) Times a Week.   11/2/2020   • divalproex (DEPAKOTE) 250 MG 24 hr tablet Take 250 mg by mouth At Night As Needed (HA).   11/2/2020   • OnabotulinumtoxinA (BOTOX IJ) Inject 1 each as directed Every 3 (Three) Months.   8/11/2020   • ondansetron (ZOFRAN) 4 MG/5ML solution Take 4 mg by mouth 2 (Two) Times a Week.   More than a month at Unknown time   • promethazine (PHENERGAN) 25 MG tablet Take 25 mg by mouth 2 (Two) Times a Day As Needed for Nausea or Vomiting.   11/13/2020   • promethazine (PHENERGAN) Infuse 12.5 mg into a venous catheter 2 (Two) Times a Week.   11/2/2020   • valproate in sodium chloride 0.9 % 100 mL IVPB Infuse 500 mg into a venous catheter 2 (Two) Times a Week.   11/2/2020         History:   Past Medical History:   Diagnosis Date   • Anxiety and depression    • Constipation    • Contact lens/glasses fitting    • Migraines    • PONV (postoperative nausea and vomiting)    • Spinal headache      Past Surgical  "History:   Procedure Laterality Date   • CHOLECYSTECTOMY     • TIBIA FRACTURE SURGERY      HARDWARE IN ANKLE     History reviewed. No pertinent family history.  Social History     Tobacco Use   • Smoking status: Never Smoker   • Smokeless tobacco: Never Used   Substance Use Topics   • Alcohol use: Never     Frequency: Never   • Drug use: Never   , has 3 children.  Disabled.     Review of Systems  Pertinent items are noted in HPI, all other systems reviewed and negative    Vital Signs  /79 (BP Location: Right arm, Patient Position: Lying)   Pulse 85   Temp 98.1 °F (36.7 °C) (Oral)   Resp 20   Ht 152.4 cm (60\")   Wt 107 kg (235 lb 0.2 oz)   SpO2 93%   Breastfeeding No Comment: neg HcG  BMI 45.90 kg/m²     Physical Exam:    General Appearance:    Alert, cooperative, in no acute distress   Head:    Normocephalic, without obvious abnormality, atraumatic   Eyes:            Lids and lashes normal, conjunctivae and sclerae normal, no   icterus, no pallor, corneas clear,   Neck: Soft c-collar on, dressing clean dry intact, drain present.   Ears:    Ears appear intact with no abnormalities noted   Throat:   No oral lesions, no thrush, oral mucosa moist   Neck:   No adenopathy, supple, trachea midline, no thyromegaly         Lungs:     Clear to auscultation,respirations regular, even and                   unlabored    Heart:    Regular rhythm and normal rate, normal S1 and S2, no            murmur, no gallop   Abdomen:     Normal bowel sounds, no masses, no organomegaly, soft        non-tender, non-distended, no guarding, no rebound                 tenderness   Genitalia:    Deferred   Extremities:   Moves all extremities well, no edema, no cyanosis, no              redness   Pulses:   Pulses palpable and equal bilaterally   Skin:   No bleeding, bruising or rash   Neurologic:   Cranial nerves 2 - 12 grossly intact, no gross motor or sensory deficit upper or lower extremities.      I reviewed the patient's " new clinical results.       Results from last 7 days   Lab Units 11/13/20  1237   WBC 10*3/mm3 11.83*   HEMOGLOBIN g/dL 13.2   HEMATOCRIT % 40.8   PLATELETS 10*3/mm3 424           Invalid input(s): LABALBU, PROT  No results found for: HGBA1C        Assessment and Plan:       Cervical stenosis of spinal canal, s/p ACDF    Migraines    Anxiety and depression  Obesity    Plan  1. PT/OT, ambulate  2. Pain control-prns   3. IS-encourage  4. DVT proph- mechanicals, ambulate  5. Bowel regimen  6. Resume home medications as appropriate  7. Monitor post-op labs  8. DC planning for for home, likely postop day 1.    I reviewed with patient and her  her home medications, resume these as appropriate, formulary substitution for Trokendi and extended release Topamax.    For anxiety I will add low-dose Xanax twice daily as needed.      Dragon disclaimer:  Part of this encounter note is an electronic transcription/translation of spoken language to printed text. The electronic translation of spoken language may permit erroneous, or at times, nonsensical words or phrases to be inadvertently transcribed; Although I have reviewed the note for such errors, some may still exist.    Sandy Decker MD  11/16/20  11:12 EST

## 2020-11-16 NOTE — OP NOTE
[facility] OPERATIVE NOTE    Pre-Operative Diagnosis:  Cervical Stenosis C5-6    Post Operative Diagnosis:  Same    Procedure: #1  Anterior Cervical Discectomy and Fusion of  C5-6    #2  Interbody Cage Placed at  C5-6 (Depuy Bengal Cage which does not have an integral plate.)    #3  Anterior Cervical Plate (independent of the cage) Spanning  C5-6     Surgeon:  Tato Riley MD    First Assistant:  JR Jimi FREEMAN  to assist with retraction, suction, visualization and implant placement.    Anesthesia:  General    EBL:  5-10cc    Drain:  Hemovac to bulb suction    Indications:  Patient presents as a 40 yo female with neck pain and headaches for years. Right posterior shoulder and arm pain for several months. Failed conservative treatment including trial of CRISTA.   Risks, benefits and both operative and non operative options were discussed with the patient preoperatively.  Specific risks including, but not limited to the risk of bleeding, infection, nerve injury, blood vessel injury, weakness, paralysis and possible worsening of pain were discussed.   After informed consent and antibiotic prophylaxis, patient brought to the operating room.  After general endotracheal anesthesia patient was positioned supine with a roll under the shoulder blades and the neck in slight extension. Head held in Philpot horsehoe head parker. Arms were tucked and taped at the side.  The bony prominences were well padded.  Neck was prepped and draped in usual sterile fashion.    Description:  Incision made to the left of midline approximately 2 fingerbreadths above the clavicle in a skin crease. Sharp dissection through the platysma. Hemostasis obtained with pickups and Bovie. Platysma was elevated cephalad and caudal for a couple of centimeters. Blunt fingertip dissection anterior to the sternocleidomastoid led readily to the anterior cervical spine. Care taken to identify palpable carotid pulse, dissection medial to this, omohyoid was  identified, retracted caudally to the other side.  Handheld retractors were used throughout. Prevertebral fascia thinned with Kittner. I identified the disk space immediately above the most prominent palpable parotid tubercle, marked it with Bovie and then advanced spinal needle, this was the C4-5 level. The prevertebral fascia was elevated subperiosteally for the disk space below this marked level, therefore only the C5-6 surgical level was exposed. The inferior half of C5 and superior half of C6 were exposed with subperiosteal dissection with the Bovie elevating the longus colli to the right and to the left. Anterior annulotomy performed at C5-6 with a #15 scalpel, disk material removed with pituitary followed by 0 and #1 curettes. Overhanging osteophyte from the vertebral body of C5 was removed with the high speed all and disk material then continued to be removed with 0 and #1 curette all the way back to the posterior longitudinal ligament. Posterior osteophytes were thinned with high speed all, followed by the #1 curette and in the right foramen 3-0 curved curette was used to sweep behind the vertebral bodies of C5 and C6 on the right side to complete foraminotomy. At completion I could swing the 3-0 curved curette through the right-sided foramen. No remaining osteophytes were present. Complete foraminotomy had been performed. High speed all was used to lightly decorticate the endplates of C5 and C6, then trials were used to 6 mm lordotic trial which did achieve additional distraction, care taken not to over-distract this level. A 6 mm cage was selected, packed with purified allograft (Vivigen) and tapped into the disk space. Good distraction was achieved. The 10 pounds of head halter traction which had been used during diskectomy and cage placement was now removed. An independent plate was selected, the cage itself does not have an integral plate. The selected single level plate was placed, the small  anterior osteophytes had already been removed, traction was removed, the screws were placed sequentially with a handheld 12 mm automatic stop drill bit, 14 mm screws were placed sequentially. Very good purchase was achieved in all 4 screws. They were tightened sequentially, then the locking mechanism was rotated over top of the screw head and locking was visually confirmed for all 4 screws. X-ray was taken cross table lateral, showed C5 well and showed the cage in the disk space fairly well. The screws in C6 were not well see but they were visualized intraoperatively. X-ray was limited by patient's body habitus. X-ray confirmed appropriate level of C5-C6 and appropriate length of the screws in C5 as well as the depth of the cage. The wound was thoroughly irrigated. Drain brought out through a separate stab incision and stitched in place. Layered closure performed with interrupted 2-0 Vicryl in the platysma. Interrupted inverted 2-0 in the subcuticular layer followed by 3-0 subcuticular skin stitch. Mastisol, Steri-Strips, and a soft cervical collar with extension applied. Patient transferred back to the stretcher. There were no intraoperative complications.    The postprocedure plan is for observation overnight. Rapid mobilization with nursing assistance. Physical therapy as necessary. Anticipate removal of drain in the morning and anticipate discharge home later tomorrow morning.       Complications:  None    Plan:  Patient to transfer to floor after cleared by anesthesia.  Rapid mobilization. Consult Dr. SPENCER for medical management. Anticipate drain out tomorrow am and Discharge tomorrow.      Tato Riley MD  11/16/20  09:15 EST

## 2020-11-16 NOTE — ANESTHESIA PROCEDURE NOTES
Airway  Urgency: elective    Date/Time: 11/16/2020 7:42 AM  Airway not difficult    General Information and Staff    Patient location during procedure: OR  CRNA: Luis Norman CRNA    Indications and Patient Condition  Indications for airway management: airway protection    Preoxygenated: yes  MILS not maintained throughout  Mask difficulty assessment: 2 - vent by mask + OA or adjuvant +/- NMBA    Final Airway Details  Final airway type: endotracheal airway      Successful airway: ETT  Cuffed: yes   Successful intubation technique: direct laryngoscopy  Endotracheal tube insertion site: oral  Blade: Stout  Blade size: 2  ETT size (mm): 7.0  Cormack-Lehane Classification: grade I - full view of glottis  Placement verified by: chest auscultation and capnometry   Cuff volume (mL): 5  Measured from: lips  ETT/EBT  to lips (cm): 21  Number of attempts at approach: 2  Assessment: lips, teeth, and gum same as pre-op and atraumatic intubation    Additional Comments  Negative epigastric sounds, Breath sound equal bilaterally with symmetric chest rise and fall

## 2020-11-17 VITALS
HEIGHT: 60 IN | SYSTOLIC BLOOD PRESSURE: 112 MMHG | TEMPERATURE: 98.3 F | HEART RATE: 90 BPM | BODY MASS INDEX: 46.14 KG/M2 | WEIGHT: 235.01 LBS | DIASTOLIC BLOOD PRESSURE: 69 MMHG | RESPIRATION RATE: 18 BRPM | OXYGEN SATURATION: 94 %

## 2020-11-17 PROBLEM — G89.18 POSTOPERATIVE PAIN: Status: ACTIVE | Noted: 2020-11-17

## 2020-11-17 LAB
ANION GAP SERPL CALCULATED.3IONS-SCNC: 12 MMOL/L (ref 5–15)
BUN SERPL-MCNC: 6 MG/DL (ref 6–20)
BUN/CREAT SERPL: 7.2 (ref 7–25)
CALCIUM SPEC-SCNC: 9.4 MG/DL (ref 8.6–10.5)
CHLORIDE SERPL-SCNC: 103 MMOL/L (ref 98–107)
CO2 SERPL-SCNC: 22 MMOL/L (ref 22–29)
CREAT SERPL-MCNC: 0.83 MG/DL (ref 0.57–1)
DEPRECATED RDW RBC AUTO: 52.8 FL (ref 37–54)
ERYTHROCYTE [DISTWIDTH] IN BLOOD BY AUTOMATED COUNT: 14.7 % (ref 12.3–15.4)
GFR SERPL CREATININE-BSD FRML MDRD: 77 ML/MIN/1.73
GLUCOSE SERPL-MCNC: 130 MG/DL (ref 65–99)
HCT VFR BLD AUTO: 38.5 % (ref 34–46.6)
HGB BLD-MCNC: 12.3 G/DL (ref 12–15.9)
MCH RBC QN AUTO: 31.4 PG (ref 26.6–33)
MCHC RBC AUTO-ENTMCNC: 31.9 G/DL (ref 31.5–35.7)
MCV RBC AUTO: 98.2 FL (ref 79–97)
PLATELET # BLD AUTO: 367 10*3/MM3 (ref 140–450)
PMV BLD AUTO: 10 FL (ref 6–12)
POTASSIUM SERPL-SCNC: 4.3 MMOL/L (ref 3.5–5.2)
RBC # BLD AUTO: 3.92 10*6/MM3 (ref 3.77–5.28)
SODIUM SERPL-SCNC: 137 MMOL/L (ref 136–145)
WBC # BLD AUTO: 18.94 10*3/MM3 (ref 3.4–10.8)

## 2020-11-17 PROCEDURE — 85027 COMPLETE CBC AUTOMATED: CPT | Performed by: INTERNAL MEDICINE

## 2020-11-17 PROCEDURE — 63710000001 DEXAMETHASONE PER 0.25 MG: Performed by: ORTHOPAEDIC SURGERY

## 2020-11-17 PROCEDURE — 25010000002 HYDROMORPHONE 1 MG/ML SOLUTION: Performed by: ORTHOPAEDIC SURGERY

## 2020-11-17 PROCEDURE — 25010000002 ONDANSETRON PER 1 MG: Performed by: ORTHOPAEDIC SURGERY

## 2020-11-17 PROCEDURE — 80048 BASIC METABOLIC PNL TOTAL CA: CPT | Performed by: INTERNAL MEDICINE

## 2020-11-17 RX ORDER — POLYETHYLENE GLYCOL 3350 17 G/17G
17 POWDER, FOR SOLUTION ORAL DAILY
Qty: 238 G | Refills: 0 | Status: SHIPPED | OUTPATIENT
Start: 2020-11-17 | End: 2022-04-28

## 2020-11-17 RX ORDER — OXYCODONE AND ACETAMINOPHEN 10; 325 MG/1; MG/1
1 TABLET ORAL EVERY 6 HOURS PRN
Qty: 40 TABLET | Refills: 0 | Status: SHIPPED | OUTPATIENT
Start: 2020-11-17 | End: 2022-04-28

## 2020-11-17 RX ADMIN — DOCUSATE SODIUM 100 MG: 100 CAPSULE ORAL at 08:14

## 2020-11-17 RX ADMIN — DEXAMETHASONE 4 MG: 4 TABLET ORAL at 06:33

## 2020-11-17 RX ADMIN — FLUOXETINE HYDROCHLORIDE 60 MG: 20 CAPSULE ORAL at 08:13

## 2020-11-17 RX ADMIN — PANTOPRAZOLE SODIUM 40 MG: 40 TABLET, DELAYED RELEASE ORAL at 06:33

## 2020-11-17 RX ADMIN — LAMOTRIGINE 200 MG: 100 TABLET ORAL at 08:14

## 2020-11-17 RX ADMIN — OXYCODONE HYDROCHLORIDE AND ACETAMINOPHEN 1 TABLET: 10; 325 TABLET ORAL at 08:13

## 2020-11-17 RX ADMIN — HYDROMORPHONE HYDROCHLORIDE 1 MG: 1 INJECTION, SOLUTION INTRAMUSCULAR; INTRAVENOUS; SUBCUTANEOUS at 03:51

## 2020-11-17 RX ADMIN — PROPRANOLOL HYDROCHLORIDE 80 MG: 20 TABLET ORAL at 08:16

## 2020-11-17 RX ADMIN — FAMOTIDINE 20 MG: 20 TABLET, FILM COATED ORAL at 08:14

## 2020-11-17 RX ADMIN — DEXAMETHASONE 4 MG: 4 TABLET ORAL at 12:58

## 2020-11-17 RX ADMIN — OXYCODONE HYDROCHLORIDE AND ACETAMINOPHEN 1 TABLET: 10; 325 TABLET ORAL at 12:58

## 2020-11-17 RX ADMIN — ONDANSETRON 4 MG: 2 INJECTION INTRAMUSCULAR; INTRAVENOUS at 03:49

## 2020-11-17 RX ADMIN — SODIUM CHLORIDE, PRESERVATIVE FREE 3 ML: 5 INJECTION INTRAVENOUS at 08:15

## 2020-11-17 RX ADMIN — BUSPIRONE HYDROCHLORIDE 30 MG: 10 TABLET ORAL at 08:13

## 2020-11-17 RX ADMIN — TOPIRAMATE 200 MG: 100 TABLET, FILM COATED ORAL at 08:16

## 2020-11-17 NOTE — DISCHARGE SUMMARY
Patient Name: Leticia Farooq  MRN: [id number]  : 1980  DOS: 2020    Attending: Tato Riley MD;Yanni*    Primary Care Provider: Tato Sapp MD    Date of Admission:.2020  5:52 AM    Date of Discharge:  2020    Discharge Diagnosis:     Cervical stenosis of spinal canal, s/p ACDF    Migraines    Anxiety and depression    Obesity    Postoperative pain  Chronic pain    Hospital Course  At Admit:     Patient is a pleasant 39 y.o. female presented with for scheduled surgery by Dr. Riley.   She has been diagnosed with cervical spinal stenosis and opted for surgical intervention.  She has had chronic migraines since , has been following migraine clinic, is on extensive regimen of medications for her migraines.  Today she underwent the following procedures under general anesthesia:  #1  Anterior Cervical Discectomy and Fusion of  C5-6                          #2  Interbody Cage Placed at  C5-6 (Depuy Bengal Cage which does not have an integral plate.)                          #3  Anterior Cervical Plate (independent of the cage) Spanning  C5-6   She tolerated surgery well and is admitted for further management.  Seen in her room postoperatively, doing fairly well, controlled postop pain, no nausea or vomiting.  She has not yet ambulated.     She has no history of DVT or PE.  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    After admit:    Patient was provided pain medications as needed for pain control.    Adjustments were made to pain medications to optimize postop pain management. Risks and benefits of opiate medications discussed with patient.    She was encouraged to ambulate, she did several times during her brief hospitalization.    Patient used an IS for atelectasis prophylaxis and mechanicals for DVT prophylaxis.  Home medications were resumed as appropriate, and labs were monitored and remained fairly stable.   She  "has a soft c-collar in place, a drain that was placed intraoperatively was removed during his stay, prior to discharge.    With the progress she has made, patient is ready for DC home today.    Discussed with patient regarding plan and she shows understanding and agreement.       Pain medication at discharge Dr. Riley      Procedures Performed  Procedure(s):  ANTERIOR CERVICAL DISCECTOMY AND FUSION C5-6 WITH PLATE, CAGE AND ALLOGRAFT       Pertinent Test Results:    I reviewed the patient's new clinical results.   Results from last 7 days   Lab Units 20  0640 20  1237   WBC 10*3/mm3 18.94* 11.83*   HEMOGLOBIN g/dL 12.3 13.2   HEMATOCRIT % 38.5 40.8   PLATELETS 10*3/mm3 367 424     Results from last 7 days   Lab Units 20  0640   SODIUM mmol/L 137   POTASSIUM mmol/L 4.3   CHLORIDE mmol/L 103   CO2 mmol/L 22.0   BUN mg/dL 6   CREATININE mg/dL 0.83   CALCIUM mg/dL 9.4   GLUCOSE mg/dL 130*     I reviewed the patient's new imaging including images and reports.      Discharge Assessment:      Visit Vitals  /82 (BP Location: Left arm, Patient Position: Lying)   Pulse 91   Temp 98.2 °F (36.8 °C) (Oral)   Resp 18   Ht 152.4 cm (60\")   Wt 107 kg (235 lb 0.2 oz)   SpO2 94%   Breastfeeding No Comment: neg HcG   BMI 45.90 kg/m²     Temp (24hrs), Av.2 °F (36.8 °C), Min:98.1 °F (36.7 °C), Max:98.5 °F (36.9 °C)      General Appearance:    Alert, cooperative, in no acute distress  Neck: Soft c-collar in place.  Drain to be removed prior to discharge   Lungs:     Clear to auscultation,respirations regular, even and                   unlabored    Heart:    Regular rhythm and normal rate, normal S1 and S2    Abdomen:     Normal bowel sounds, no masses, no organomegaly, soft        non-tender, non-distended, no guarding, no rebound                 tenderness   Extremities:   Moves all extremities well, no edema, no cyanosis, no              redness   Pulses:   Pulses palpable and equal bilaterally   Skin:   No " bleeding, bruising or rash   Neurologic:   Cranial nerves 2 - 12 grossly intact, sensation intact, no gross deficit       Discharge Disposition: Home          Discharge Medications      New Medications      Instructions Start Date   oxyCODONE-acetaminophen  MG per tablet  Commonly known as: PERCOCET   1 tablet, Oral, Every 6 Hours PRN      polyethylene glycol 17 g packet  Commonly known as: MIRALAX   17 g, Oral, Daily         Continue These Medications      Instructions Start Date   BOTOX IJ   1 each, Injection, Every 3 Months      busPIRone 30 MG tablet  Commonly known as: BUSPAR   30 mg, Oral, 2 Times Daily      cyclobenzaprine 10 MG tablet  Commonly known as: FLEXERIL   10 mg, Oral, Daily PRN, 1/2 TO 1 TABLET AS NEEDED FOR NECK PAIN       D.H.E. 45 1 MG/ML injection  Generic drug: dihydroergotamine   1 mg, Intravenous, Every 8 Hours PRN      dihydroergotamine in sodium chloride 0.9 % 50 mL IVPB   1 mg, Intravenous, As Needed      diphenhydrAMINE  Commonly known as: BENADRYL   25 mg, Intravenous, 2 Times Weekly      divalproex 250 MG 24 hr tablet  Commonly known as: DEPAKOTE   250 mg, Oral, Nightly PRN      docusate sodium 100 MG capsule  Commonly known as: COLACE   300 mg, Oral, Daily      FLUoxetine 20 MG capsule  Commonly known as: PROzac   60 mg, Oral, Daily      l-methylfolate 15 MG tablet tablet   15 mg, Oral, Daily      lamoTRIgine 200 MG tablet  Commonly known as: LaMICtal   200 mg, Oral, Daily      omeprazole 40 MG capsule  Commonly known as: priLOSEC   40 mg, Oral, Daily      ondansetron 4 MG/5ML solution  Commonly known as: ZOFRAN   4 mg, Oral, 2 Times Weekly      paliperidone 6 MG 24 hr tablet  Commonly known as: INVEGA   6 mg, Oral, Every Evening      prochlorperazine 10 MG tablet  Commonly known as: COMPAZINE   10 mg, Oral, Every 8 Hours PRN      promethazine  Commonly known as: PHENERGAN   12.5 mg, Intravenous, 2 Times Weekly      promethazine 25 MG tablet  Commonly known as: PHENERGAN   25  mg, Oral, 2 Times Daily PRN      propranolol 40 MG tablet  Commonly known as: INDERAL   80 mg, Oral, 3 Times Daily      Trokendi  MG capsule sustained-release 24 hr  Generic drug: Topiramate ER   2 capsules, Oral, Daily      valproate in sodium chloride 0.9 % 100 mL IVPB   500 mg, Intravenous, 2 Times Weekly             Discharge Diet: Resume prehospital diet    Activity at Discharge: Soft c-collar to remain until follow-up with Dr. Riley    Follow-up Appointments  2 weeks with MD Sandy Marcelo MD  11/17/20  09:53 EST

## 2020-11-17 NOTE — PROGRESS NOTES
Discharge Planning Assessment  [facility]     Patient Name: Leticia Farooq  MRN: [id number]  Today's Date: 11/17/2020    Admit Date: 11/16/2020    Discharge Needs Assessment     Row Name 11/17/20 1134       Living Environment    Lives With  spouse    Name(s) of Who Lives With Patient  Angel Farooq    Current Living Arrangements  home/apartment/condo    Family Caregiver if Needed  spouse    Quality of Family Relationships  involved;helpful;supportive    Able to Return to Prior Arrangements  yes       Resource/Environmental Concerns    Resource/Environmental Concerns  home accessibility    Home Accessibility Concerns  stairs to enter home    Transportation Concerns  car, none       Transition Planning    Patient/Family Anticipates Transition to  home with family    Patient/Family Anticipated Services at Transition  none    Transportation Anticipated  family or friend will provide       Discharge Needs Assessment    Readmission Within the Last 30 Days  no previous admission in last 30 days    Equipment Currently Used at Home  none    Concerns to be Addressed  no discharge needs identified    Equipment Needed After Discharge  none        Discharge Plan     Row Name 11/17/20 1131       Plan    Plan  Home with 's assistance    Patient/Family in Agreement with Plan  yes    Plan Comments  Met with Ms. Faoroq and her , Angel, at the bedside for discharge planning.  Ms. Farooq lives with Angel in a one story home, 3 steps to enter, in [facility].  She states that she is ambulating independently.  She denies any DME or home health needs.  Ms. Farooq states that Angel can assist her at home as needed and will be transporting her home when discharged.    CM will continue to follow.    Final Discharge Disposition Code  01 - home or self-care        Continued Care and Services - Admitted Since 11/16/2020    Coordination has not been started for this encounter.       Expected Discharge Date and  Time     Expected Discharge Date Expected Discharge Time    Nov 17, 2020         Demographic Summary     Row Name 11/17/20 1131       General Information    Admission Type  observation    Arrived From  home    Reason for Consult  discharge planning    General Information Comments  PCP:  Tato Sapp       Contact Information    Permission Granted to Share Info With      Contact Information Comments  :  Angel,  [phone]        Functional Status     Row Name 11/17/20 1133       Functional Status    Usual Activity Tolerance  good    Current Activity Tolerance  good       Functional Status, IADL    Medications  independent    Meal Preparation  independent    Housekeeping  independent    Laundry  independent    Shopping  independent       Mental Status    General Appearance WDL  WDL        Psychosocial    No documentation.       Abuse/Neglect    No documentation.       Legal    No documentation.       Substance Abuse    No documentation.       Patient Forms    No documentation.           Milly Jonas RN

## 2020-11-17 NOTE — PLAN OF CARE
Goal Outcome Evaluation:  Plan of Care Reviewed With: patient  Progress: improving  Outcome Summary: alert; VSS, RA; JENNIFER drain intact; ambulated multiple times to restroom, SCDs on; PRN meds used for pain control; rested comfortably; will  continue to monitor

## 2020-11-17 NOTE — PLAN OF CARE
Goal Outcome Evaluation:  Plan of Care Reviewed With: patient, spouse  Progress: improving  Outcome Summary: DC'd home

## 2020-11-17 NOTE — PROGRESS NOTES
"Ortho Spine Progress Note     LOS: 0 days   Patient Care Team:  Tato Sapp MD as PCP - General (Family Medicine)    Subjective Pt awake and alert in bed. Having incisional pain and posterior neck pain, both very common after ACDF, but more severe in her than average. Will improve with time.    Objective     Vital Signs:  /82 (BP Location: Left arm, Patient Position: Lying)   Pulse 91   Temp 98.2 °F (36.8 °C) (Oral)   Resp 18   Ht 152.4 cm (60\")   Wt 107 kg (235 lb 0.2 oz)   SpO2 94%   Breastfeeding No Comment: neg HcG  BMI 45.90 kg/m²          Labs:  Lab Results (last 24 hours)     Procedure Component Value Units Date/Time    Basic Metabolic Panel [[id number]]  (Abnormal) Collected: 11/17/20 0640    Specimen: Blood Updated: 11/17/20 0758     Glucose 130 mg/dL      BUN 6 mg/dL      Creatinine 0.83 mg/dL      Sodium 137 mmol/L      Potassium 4.3 mmol/L      Chloride 103 mmol/L      CO2 22.0 mmol/L      Calcium 9.4 mg/dL      eGFR Non African Amer 77 mL/min/1.73      BUN/Creatinine Ratio 7.2     Anion Gap 12.0 mmol/L     Narrative:      GFR Normal >60  Chronic Kidney Disease <60  Kidney Failure <15      CBC (No Diff) [[id number]]  (Abnormal) Collected: 11/17/20 0640    Specimen: Blood Updated: 11/17/20 0733     WBC 18.94 10*3/mm3      RBC 3.92 10*6/mm3      Hemoglobin 12.3 g/dL      Hematocrit 38.5 %      MCV 98.2 fL      MCH 31.4 pg      MCHC 31.9 g/dL      RDW 14.7 %      RDW-SD 52.8 fl      MPV 10.0 fL      Platelets 367 10*3/mm3           Physical Exam:  Neurovascular intact.  Calves soft, non-tender.     Assessment/Plan   D/C drain. Dry dressing. D/C home after seen by Dr. KALA Mo for Percocet 10 #40 on chart. F/U to see me in 2 weeks.    Tato Riley MD  11/17/20  08:17 EST      "

## 2021-04-21 NOTE — PAT
Patient to apply Chlorhexadine wipes  to surgical area (as instructed) the night before procedure and the AM of procedure. Wipes provided.    Patient instructed to drink 20 ounces (or until full) of Gatorade and it needs to be completed 1 hour before given arrival time for procedure (NO RED Gatorade)    Patient verbalized understanding.    COVID TEST PERFORMED TODAY.       HA, neck pain, decrease ROM, joint pain, dizziness, or vision changes/no loss of consciousness/no weakness

## 2021-08-26 PROBLEM — M47.812 CERVICAL SPONDYLOSIS WITHOUT MYELOPATHY: Status: ACTIVE | Noted: 2021-08-26

## 2021-08-26 PROBLEM — Z98.1 HISTORY OF FUSION OF CERVICAL SPINE: Status: ACTIVE | Noted: 2021-08-26

## 2021-08-26 PROBLEM — M54.81 BILATERAL OCCIPITAL NEURALGIA: Status: ACTIVE | Noted: 2021-08-26

## 2021-08-26 PROBLEM — G43.719 INTRACTABLE CHRONIC MIGRAINE WITHOUT AURA AND WITHOUT STATUS MIGRAINOSUS: Status: ACTIVE | Noted: 2021-08-26

## 2021-08-27 NOTE — PROGRESS NOTES
"Chief Complaint: \" Pain in my neck and occipital neuralgia.\"      History of Present Illness:   Patient: Ms. Leticia Farooq, 40 y.o. female   Referring Physician: Dr. Sorenson  Reason for Referral: Consultation for chronic intractable posterior neck pain and occipital headaches  Pain History: Patient reports a longstanding history of neck pain and headaches for several years.  Patient has a history of anterior cervical discectomy and fusion C5-C6 with plate, cage and allograft by Dr. Tato Riley on 11/16/2020. Unfortunately, she has continued experiencing neck pain and headaches.  MRI of the cervical spine from July 2021 revealed evidence of previous ACDF C5-C6.  No significant canal or foraminal stenosis. Patient has failed to obtain pain relief with conservative measures including oral analgesics, physical therapy, chiropractic therapy, to name a few.  In addition, patient reports that she underwent a trial of cervical epidural steroid injections with Dr. CLINT Jansen without significant benefit (prior to surgery).  Also, she has undergone trigger point injections, Botox therapy, IV infusions, to name a few. She has been to the emergency department on several occasions for treatment of her unrelenting headaches. Leticia Farooq underwent orthopedic surgical consultation with Dr. Riley about six months ago and was found not to be a surgical candidate. Pain has progressed in intensity over the past months.   Pain Description: Constant posterior neck and bilateral occipital pain with intermittent exacerbation, described as aching and sharp sensation.   Radiation of Pain: The pain radiates from the posterior cervical region into the occipital region and into the forehead causing headaches   Pain intensity today: 8/10  Average pain intensity last week: 5/10  Pain intensity ranges from: 4/10 to 9/10  Aggravating factors: Pain increases with extension, rotation of the cervical spine   Alleviating factors: Pain decreases " with lying down with an ice-pack  Associated Symptoms:   Patient denies pain, numbness, or weakness in the upper and lower extremities.   Patient denies any new bladder or bowel problems.   Patient denies difficulties with her balance or recent falls.   Patient reports daily bilateral cervicogenic and occipital headaches lasting all day. Worse during the daytime  Pain interferes with her sleep sometimes    Review of previous therapies and additional medical records:  Leticia Farooq has already failed the following measures, including:   Conservative Measures: Oral analgesics, opioids, topical analgesics, ice, heat, TENs, chiropractic therapy, massage, physical therapy   Interventional Measures: Cervical epidural steroid injections with Dr. CLINT Jansen (prior to surgery). Patient undergoes Botox therapy every 3 months for treatment of her migraines. Trigger point injections, IV infusions twice weekly  Surgical Measures: On 11/16/2020: Anterior cervical discectomy and fusion C5-C6 with plate, cage and allograft by Dr. Tato Riley. No history of previous shoulder surgery   Leticia Farooq underwent orthopedic surgical consultation with Dr. Riley about six months ago and was found not to be a surgical candidate.  Leticia Farooq presents with significant comorbidities including migraines, anxiety and depression, obesity, chronic constipation.  Patient takes Eliquis for history of DVT associated with PICC line  In terms of current analgesics, Leticia Farooq takes: Propranolol, dihydroergotamine, Depakote, baclofen, Toradol, Keppra, Robaxin, Nurtec. Patient also takes diphenhydramine, Lamictal, ondansetron, paliperidone, prochlorperazine, promethazine   I have reviewed [facility] Report #[id number] (no CS) consistent with medication reconciliation.  SOAPP: Not completed by the patient    Global Pain Scale 08-31 2021          Pain 18          Feelings 12          Clinical outcomes 10          Activities 4          GPS  Total: 44            Review of Diagnostic Studies:    MRI of the cervical spine without contrast on 7/16/2021. I have reviewed the images with the patient as well as the radiology report. Vertebral body heights and alignment are preserved.  Evidence of previous anterior fusion at C5-C6.  The craniocervical junction appears unremarkable.  Axial imaging:  C1-C2, C2-C3, C3-C4, C4-C5: No significant canal or foraminal stenosis  C5-C6: Metal artifact from anterior plate and screws.  Posterior disc spur complex results in mild canal stenosis and mild left neuroforaminal stenosis  C6-C7: Posterior lateral spurring results in mild left neuroforaminal stenosis  C7-T1: Posterior left for disc protrusion.  Mild left foraminal stenosis    Review of Systems   Musculoskeletal: Positive for neck pain and neck stiffness.   Neurological: Positive for light-headedness and headaches.   Psychiatric/Behavioral: Positive for sleep disturbance. The patient is nervous/anxious (depression).    All other systems reviewed and are negative.        Patient Active Problem List   Diagnosis   • Cervical stenosis of spinal canal, s/p ACDF   • Migraines   • Anxiety and depression   • Obesity   • Postoperative pain   • Bilateral occipital neuralgia   • Cervical spondylosis without myelopathy   • History of fusion of cervical spine   • Intractable chronic migraine without aura and without status migrainosus   • TMJ dysfunction   • Morbid obesity with BMI of 40.0-44.9, adult (CMS/[facility])   • Bruxism       Past Medical History:   Diagnosis Date   • Anxiety and depression    • Constipation    • Contact lens/glasses fitting    • Migraines    • PONV (postoperative nausea and vomiting)    • Spinal headache          Past Surgical History:   Procedure Laterality Date   • ANTERIOR CERVICAL DISCECTOMY W/ FUSION N/A 11/16/2020    Procedure: ANTERIOR CERVICAL DISCECTOMY AND FUSION C5-6 WITH PLATE, CAGE AND ALLOGRAFT;  Surgeon: Tato Riley MD;  Location: [facility]  OR;  Service: Orthopedic Spine;  Laterality: N/A;   • CHOLECYSTECTOMY     • TIBIA FRACTURE SURGERY      HARDWARE IN ANKLE         Family History   Problem Relation Age of Onset   • Cancer Mother    • Arthritis Father    • Hypertension Father    • Arthritis Brother          Social History     Socioeconomic History   • Marital status:      Spouse name: Not on file   • Number of children: Not on file   • Years of education: Not on file   • Highest education level: Not on file   Tobacco Use   • Smoking status: Never Smoker   • Smokeless tobacco: Never Used   Vaping Use   • Vaping Use: Never used   Substance and Sexual Activity   • Alcohol use: Never   • Drug use: Never   • Sexual activity: Defer          Current Outpatient Medications:   •  dihydroergotamine (D.H.E. 45) 1 MG/ML injection, Infuse 1 mg into a venous catheter Every 8 (Eight) Hours As Needed for Migraine., Disp: , Rfl:   •  dihydroergotamine in sodium chloride 0.9 % 50 mL IVPB, Infuse 1 mg into a venous catheter As Needed., Disp: , Rfl:   •  diphenhydrAMINE (BENADRYL), Infuse 25 mg into a venous catheter 2 (Two) Times a Week., Disp: , Rfl:   •  docusate sodium (COLACE) 100 MG capsule, Take 300 mg by mouth Daily., Disp: , Rfl:   •  Eptinezumab-jjmr (Vyepti) 100 MG/ML solution solution, Infuse 100 mg of eptinezumab-jjmr (VYEPTI) in 100 mL of 0.9% sodium chloride solution intravenously over 30 minutes, every 3 months. Remove PICC line if present., Disp: , Rfl:   •  l-methylfolate 15 MG tablet tablet, Take 15 mg by mouth Daily., Disp: , Rfl:   •  lamoTRIgine (LaMICtal) 200 MG tablet, Take 200 mg by mouth Daily., Disp: , Rfl:   •  omeprazole (priLOSEC) 40 MG capsule, Take 40 mg by mouth Daily., Disp: , Rfl:   •  OnabotulinumtoxinA (BOTOX IJ), Inject 1 each as directed Every 3 (Three) Months., Disp: , Rfl:   •  ondansetron (ZOFRAN) 4 MG/5ML solution, Take 4 mg by mouth 2 (Two) Times a Week., Disp: , Rfl:   •  paliperidone (INVEGA) 6 MG 24 hr tablet, Take 6  mg by mouth Every Evening., Disp: , Rfl:   •  prochlorperazine (COMPAZINE) 10 MG tablet, Take 10 mg by mouth Every 8 (Eight) Hours As Needed for Nausea or Vomiting (HA)., Disp: , Rfl:   •  promethazine (PHENERGAN) 25 MG tablet, Take 25 mg by mouth 2 (Two) Times a Day As Needed for Nausea or Vomiting., Disp: , Rfl:   •  promethazine (PHENERGAN), Infuse 12.5 mg into a venous catheter 2 (Two) Times a Week., Disp: , Rfl:   •  propranolol (INDERAL) 40 MG tablet, Take 80 mg by mouth 3 (Three) Times a Day., Disp: , Rfl:   •  Rimegepant Sulfate (NURTEC) 75 MG tablet dispersible tablet, Take 75 mg by mouth., Disp: , Rfl:   •  valproate in sodium chloride 0.9 % 100 mL IVPB, Infuse 500 mg into a venous catheter 2 (Two) Times a Week., Disp: , Rfl:   •  baclofen (LIORESAL) 10 MG tablet, TAKE 1 TABLET BY MOUTH THREE TIMES A DAY AS NEEDED FOR HEADACHE/NECKACHE, Disp: , Rfl:   •  busPIRone (BUSPAR) 30 MG tablet, Take 30 mg by mouth 2 (Two) Times a Day., Disp: , Rfl:   •  [employer] D3 125 MCG (5000 UT) capsule capsule, , Disp: , Rfl:   •  cyclobenzaprine (FLEXERIL) 10 MG tablet, Take 10 mg by mouth Daily As Needed for Muscle Spasms. 1/2 TO 1 TABLET AS NEEDED FOR NECK PAIN, Disp: , Rfl:   •  divalproex (DEPAKOTE) 250 MG 24 hr tablet, Take 250 mg by mouth At Night As Needed (HA)., Disp: , Rfl:   •  Eliquis DVT/PE Starter Pack tablet therapy pack, Take two 5 mg tablets by mouth every 12 hours for 7 days. Followed by one 5 mg tablet every 12 hours. (Dispense starter pack if available), Disp: , Rfl:   •  FLUoxetine (PROzac) 20 MG capsule, Take 60 mg by mouth Daily., Disp: , Rfl:   •  ketorolac (TORADOL) 10 MG tablet, , Disp: , Rfl:   •  levETIRAcetam (KEPPRA) 250 MG tablet, , Disp: , Rfl:   •  levoFLOXacin (LEVAQUIN) 500 MG tablet, Take 500 mg by mouth Daily., Disp: , Rfl:   •  methocarbamol (ROBAXIN) 500 MG tablet, , Disp: , Rfl:   •  oxyCODONE-acetaminophen (PERCOCET)  MG per tablet, Take 1 tablet by mouth Every 6 (Six) Hours As Needed  "for Moderate Pain  for up to 40 doses., Disp: 40 tablet, Rfl: 0  •  polyethylene glycol (MIRALAX) 17 GM/SCOOP powder, Dissolve 17g (1 capful) in 8oz glass of water by mouth Daily for 10 doses, Disp: 238 g, Rfl: 0  •  Topiramate ER (Trokendi XR) 200 MG capsule sustained-release 24 hr, Take 2 capsules by mouth Daily., Disp: , Rfl:       No Known Allergies      /80   Pulse 85   Temp 98.6 °F (37 °C)   Resp 14   Ht 152.4 cm (60\")   Wt 93.4 kg (206 lb)   SpO2 98%   BMI 40.23 kg/m²       Physical Exam:  Constitutional: Patient appears well-developed, well-nourished, well-hydrated  HEENT: Head: Normocephalic and atraumatic  Eyes: Conjunctivae and lids are normal  Pupils: Equal, round, reactive to light  TMJ: Mandibular opening normal. Opening pattern abnormal with deviation toward the right. There is crepitus. Palpation of the joint in the ear canal while the patient opens and closes the mandible reveals abnormal condylar movement with tenderness.   Neck: Trachea normal. Neck supple. No JVD present.   Lymphatic: No cervical adenopathy  Musculoskeletal   Gait and station: Gait evaluation demonstrated a normal gait. Able to walk on heels, toes, tandem walking   Cervical spine: Passive and active range of motion are limited secondary to pain. Extension, lateral flexion, rotation of the cervical spine increased and reproduced pain. Cervical facet joint loading maneuvers are positive at C2-C3, C3-C4, C4-C5.  Palpation at the occipital and suboccipital regions reproduces pain.  Muscles: Presence of active trigger points: MARY levator scapulae   Shoulders: The range of motion of the glenohumeral joints is full and without pain. Rotator cuff strength is 5/5.   Neurological:   Patient is alert and oriented to person, place, and time.   Speech: Normal.   Cortical function: Normal mental status.   Cranial nerves 2-12: intact.   Reflex Scores:  Right brachioradialis: 1+  Left brachioradialis: 1+  Right biceps: 1+  Left " biceps: 1+  Right triceps: 1+  Left triceps: 1+  Right patellar: 0+  Left patellar: 0+  Right Achilles: 0+  Left Achilles: 0+  Motor strength: 5/5  Motor Tone: Normal .   Involuntary movements: None.   Superficial/Primitive Reflexes: Primitive reflexes were absent.   Right Franklin: Absent  Left Franklin: Absent  Right ankle clonus: Absent  Left ankle clonus: Absent   Babinsky: Absent  Spurling sign: Negative. Neck tornado test: Negative. Lhermitte sign: Negative. Long tract signs: Negative.   Sensory exam: Intact to light touch, intact pain and temperature sensation, intact vibration sensation and normal proprioception.   Coordination: Normal finger to nose and heel to shin. Normal balance and negative Romberg's sign   Skin and subcutaneous tissue: Skin is warm and intact. No rash noted. No cyanosis.   Psychiatric: Judgment and insight: Normal. Recent and remote memory: Intact. Mood and affect: Normal.     ASSESSMENT:   1. Cervical spondylosis without myelopathy    2. Bilateral occipital neuralgia    3. History of fusion of cervical spine    4. Intractable chronic migraine without aura and without status migrainosus    5. Morbid obesity with BMI of 40.0-44.9, adult (CMS/[facility])    6. Bruxism    7. TMJ dysfunction    8. Anxiety and depression          PLAN/MEDICAL DECISION MAKING: Patient reports a longstanding history of neck pain and headaches for several years.  Her history is complicated by a longstanding history of migraine headaches, axial/mechanical cervicalgia, and occipital neuralgia.  Her history is quite complex.  Patient has a history of anterior cervical discectomy and fusion C5-C6 with plate, cage and allograft by Dr. Tato Riley on 11/16/2020. Unfortunately, she has continued experiencing neck pain and headaches.  Leticia Farooq underwent orthopedic surgical consultation with Dr. Riley about six months ago and was found not to be a surgical candidate. MRI of the cervical spine from July 2021 revealed  evidence of previous ACDF C5-C6.  No significant canal or foraminal stenosis.  There is some facet and uncovertebral hypertrophy above the level of the cervical fusion.  Upon physical examination, she presents with positive findings upon provocative maneuvers to the cervical facet joints.  She is also extremely sensitive to palpation in the suboccipital and occipital region. Patient has failed to obtain pain relief with conservative measures including oral analgesics, physical therapy, chiropractic therapy, to name a few.  Patient undergoes trigger point injections, IV infusions, to name a few about twice weekly.  She undergoes Botox therapy every 3 months. She has recently returned from the [facility] in [place] where she spent 12 days for treatment of her headaches. She has been to the emergency department on several occasions for treatment of her unrelenting headaches. Pain has progressed in intensity over the past months. Patient has failed to obtain pain relief with conservative measures, interventional pain management measures, and previous surgical intervention, as referenced above. I have reviewed all available patient's medical records as well as previous therapies as referenced above. I had a lengthy conversation with Ms. Leticia Jones Pjmariama regarding her chronic pain condition and potential therapeutic options including risks, benefits, alternative therapies, to name a few. Therefore, I have proposed the following plan:  1. Diagnostic studies: X-rays of the cervical spine full views including flexion and extension  2. Pharmacological measures: Reviewed and discussed; Patient takes Propranolol, dihydroergotamine, Depakote, baclofen, Toradol, Keppra, Robaxin, Nurtec. Patient also takes diphenhydramine, Lamictal, ondansetron, paliperidone, prochlorperazine, promethazine   3. Interventional pain management measures: Patient will be scheduled for diagnostic bilateral cervical medial branch blocks at C3, C4,  C5; for bilateral cervical facet joints at C3-C4, C4-C5, to clarify the origin of chronic refractory mechanical/axial cervicalgia. If patient experiences more than 80% pain relief along with significant improvement in the range of motion of the cervical spine, then, patient will be scheduled for a second set of diagnostic bilateral cervical medial branch blocks, to then, proceed with bilateral cervical medial branch rhizotomies.  We have also discussed the possibility of diagnostic and therapeutic bilateral greater occipital nerve blocks by hydrodissection technique under ultrasound and fluoroscopic guidance.  Patient has been found not to be a surgical candidate.  Therefore, I have also discussed the possibility of a spinal cord stimulator trial with [facility].  Unfortunately, peripheral nerve stimulation is usually not covered by insurance.  She would be an ideal candidate for for an occipital nerve stimulator trial.  In case of a spinal cord stimulator trial, patient will need to stop Eliquis for 72 hours prior to the trial and remain off Eliquis for 4 to 5 days during the trial  4. Long-term rehabilitation efforts:  A. The patient does not have a history of falls. I did complete a risk assessment for falls  B. [facility] ultrasound, myofascial release, cupping and dry needling, posture/position body mechanics, range of motion  C. Start an exercise program such as water therapy and daily walks for fitness  D. Contrast therapy: Apply ice-packs for 15-20 minutes, followed by heating pads for 15-20 minutes to affected area   E. Referral to Dr. Jay Mace for psychological screening for spinal cord stimulation and intrathecal therapies, cognitive behavioral therapy and biofeedback.  F. Referral to [facility]. Patient counseled on the importance of weight loss to help with overall health and pain  control. Patient instructed to attempt weight loss.    G. I have encouraged the patient to wear her mouth appliance/bite guard every day  H.  If patient has not been screened for obstructive sleep apnea, we could make a referral to pulmonology for that.  5. The patient has been instructed to contact my office with any questions or difficulties. The patient understands the plan and agrees to proceed accordingly.      Patient Care Team:  Tato Sapp MD as PCP - General (General Practice)     No orders of the defined types were placed in this encounter.        No future appointments.      Soy Nevarez MD     EMR Dragon/Transcription disclaimer:  Much of this encounter note is an electronic transcription of spoken language to printed text. Electronic transcription of spoken language may permit erroneous, or at times, nonsensical words or phrases to be inadvertently transcribed. Although I have reviewed the note for such errors, some may still exist.

## 2021-08-31 ENCOUNTER — OFFICE VISIT (OUTPATIENT)
Dept: PAIN MEDICINE | Facility: CLINIC | Age: 41
End: 2021-08-31

## 2021-08-31 ENCOUNTER — HOSPITAL ENCOUNTER (OUTPATIENT)
Dept: GENERAL RADIOLOGY | Facility: HOSPITAL | Age: 41
Discharge: HOME | End: 2021-08-31
Admitting: ANESTHESIOLOGY

## 2021-08-31 VITALS
WEIGHT: 206 LBS | DIASTOLIC BLOOD PRESSURE: 80 MMHG | TEMPERATURE: 98.6 F | HEART RATE: 85 BPM | SYSTOLIC BLOOD PRESSURE: 130 MMHG | OXYGEN SATURATION: 98 % | RESPIRATION RATE: 14 BRPM | HEIGHT: 60 IN | BODY MASS INDEX: 40.44 KG/M2

## 2021-08-31 DIAGNOSIS — F45.8 BRUXISM: ICD-10-CM

## 2021-08-31 DIAGNOSIS — G43.719 INTRACTABLE CHRONIC MIGRAINE WITHOUT AURA AND WITHOUT STATUS MIGRAINOSUS: ICD-10-CM

## 2021-08-31 DIAGNOSIS — M47.812 CERVICAL SPONDYLOSIS WITHOUT MYELOPATHY: ICD-10-CM

## 2021-08-31 DIAGNOSIS — M26.609 TMJ DYSFUNCTION: ICD-10-CM

## 2021-08-31 DIAGNOSIS — E66.01 MORBID OBESITY WITH BMI OF 40.0-44.9, ADULT (HCC): ICD-10-CM

## 2021-08-31 DIAGNOSIS — F41.9 ANXIETY AND DEPRESSION: ICD-10-CM

## 2021-08-31 DIAGNOSIS — Z98.1 HISTORY OF FUSION OF CERVICAL SPINE: ICD-10-CM

## 2021-08-31 DIAGNOSIS — M47.812 CERVICAL SPONDYLOSIS WITHOUT MYELOPATHY: Primary | ICD-10-CM

## 2021-08-31 DIAGNOSIS — M54.81 BILATERAL OCCIPITAL NEURALGIA: ICD-10-CM

## 2021-08-31 DIAGNOSIS — Z00.8 PRE-SURGICAL PSYCHOLOGICAL ASSESSMENT, ENCOUNTER FOR: ICD-10-CM

## 2021-08-31 DIAGNOSIS — F32.A ANXIETY AND DEPRESSION: ICD-10-CM

## 2021-08-31 PROCEDURE — 72052 X-RAY EXAM NECK SPINE 6/>VWS: CPT

## 2021-08-31 PROCEDURE — 99204 OFFICE O/P NEW MOD 45 MIN: CPT | Performed by: ANESTHESIOLOGY

## 2021-08-31 RX ORDER — CHOLECALCIFEROL (VITAMIN D3) 125 MCG
CAPSULE ORAL
COMMUNITY
Start: 2021-08-30 | End: 2022-04-28

## 2021-08-31 RX ORDER — LEVOFLOXACIN 500 MG/1
500 TABLET, FILM COATED ORAL DAILY
COMMUNITY
Start: 2021-08-09 | End: 2022-04-28

## 2021-08-31 RX ORDER — KETOROLAC TROMETHAMINE 10 MG/1
TABLET, FILM COATED ORAL
COMMUNITY
Start: 2021-08-30

## 2021-08-31 RX ORDER — LEVETIRACETAM 250 MG/1
TABLET ORAL
COMMUNITY
Start: 2021-08-30

## 2021-08-31 RX ORDER — BACLOFEN 10 MG/1
TABLET ORAL
COMMUNITY
Start: 2021-08-11 | End: 2022-12-07

## 2021-08-31 RX ORDER — EPTINEZUMAB-JJMR 100 MG/ML
INJECTION INTRAVENOUS
COMMUNITY
Start: 2021-08-26 | End: 2022-04-28

## 2021-08-31 RX ORDER — METHOCARBAMOL 500 MG/1
TABLET, FILM COATED ORAL
COMMUNITY
Start: 2021-08-30

## 2021-08-31 RX ORDER — APIXABAN 5 MG (74)
KIT ORAL
COMMUNITY
Start: 2021-08-10 | End: 2022-04-28

## 2021-09-02 ENCOUNTER — PATIENT ROUNDING (BHMG ONLY) (OUTPATIENT)
Dept: PAIN MEDICINE | Facility: CLINIC | Age: 41
End: 2021-09-02

## 2021-09-02 NOTE — PROGRESS NOTES
September 2, 2021    Hello, may I speak with Leticia Farooq?    My name is JOI    I am  with [facility]  [address]     [facility] [address] [phone].    Before we get started may I verify your date of birth? 1980    I am calling to officially welcome you to our practice and ask about your recent visit. Is this a good time to talk? YES    Tell me about your visit with us. What things went well?  EVERYTHING WENT WELL       We're always looking for ways to make our patients' experiences even better. Do you have recommendations on ways we may improve?  NO    Overall were you satisfied with your first visit to our practice? YES       I appreciate you taking the time to speak with me today. Is there anything else I can do for you? NO      Thank you, and have a great day.

## 2021-09-15 ENCOUNTER — OUTSIDE FACILITY SERVICE (OUTPATIENT)
Dept: PAIN MEDICINE | Facility: CLINIC | Age: 41
End: 2021-09-15

## 2021-09-15 DIAGNOSIS — M47.812 CERVICAL SPONDYLOSIS WITHOUT MYELOPATHY: Primary | ICD-10-CM

## 2021-09-15 PROCEDURE — 64491 INJ PARAVERT F JNT C/T 2 LEV: CPT | Performed by: ANESTHESIOLOGY

## 2021-09-15 PROCEDURE — 99152 MOD SED SAME PHYS/QHP 5/>YRS: CPT | Performed by: ANESTHESIOLOGY

## 2021-09-15 PROCEDURE — 64490 INJ PARAVERT F JNT C/T 1 LEV: CPT | Performed by: ANESTHESIOLOGY

## 2021-09-16 ENCOUNTER — TELEPHONE (OUTPATIENT)
Dept: PAIN MEDICINE | Facility: CLINIC | Age: 41
End: 2021-09-16

## 2021-09-16 NOTE — TELEPHONE ENCOUNTER
Spoke with pt to advise of next procedure date. Advised nothing to eat or drink prior to night before, must have  and that the procedure is over in the surgery center. Pt also stated neck a little sore. Advised pt she can alternate heat and ice and also ibuprofen and tylenol. Pt verbalized understanding.

## 2021-09-21 DIAGNOSIS — M47.812 CERVICAL SPONDYLOSIS WITHOUT MYELOPATHY: Primary | ICD-10-CM

## 2021-09-27 ENCOUNTER — OUTSIDE FACILITY SERVICE (OUTPATIENT)
Dept: PAIN MEDICINE | Facility: CLINIC | Age: 41
End: 2021-09-27

## 2021-09-27 PROCEDURE — 64490 INJ PARAVERT F JNT C/T 1 LEV: CPT | Performed by: ANESTHESIOLOGY

## 2021-09-27 PROCEDURE — 64491 INJ PARAVERT F JNT C/T 2 LEV: CPT | Performed by: ANESTHESIOLOGY

## 2021-09-27 PROCEDURE — 99152 MOD SED SAME PHYS/QHP 5/>YRS: CPT | Performed by: ANESTHESIOLOGY

## 2021-09-28 DIAGNOSIS — M47.812 CERVICAL SPONDYLOSIS WITHOUT MYELOPATHY: Primary | ICD-10-CM

## 2021-09-29 ENCOUNTER — TELEPHONE (OUTPATIENT)
Dept: PAIN MEDICINE | Facility: CLINIC | Age: 41
End: 2021-09-29

## 2021-09-29 NOTE — TELEPHONE ENCOUNTER
Spoke with patient. Went over [facility] information and procedure appointment time and location. Information packet with appointment card sent in mail.

## 2021-10-05 ENCOUNTER — TELEPHONE (OUTPATIENT)
Dept: PAIN MEDICINE | Facility: CLINIC | Age: 41
End: 2021-10-05

## 2021-10-05 NOTE — TELEPHONE ENCOUNTER
Spoke with patient. Advised that we still had not received her new insurance information, therefore we had not received authorization for her procedure. Requested that she provide new insurance information and we would move forward with injection.

## 2021-10-12 ENCOUNTER — TELEPHONE (OUTPATIENT)
Dept: PAIN MEDICINE | Facility: CLINIC | Age: 41
End: 2021-10-12

## 2021-10-12 NOTE — TELEPHONE ENCOUNTER
Provider: DR SIDHU    Caller: ROSCOE ARNOLD    Relationship to Patient: SELF    Phone Number: [phone]    Reason for Call: PT CALLED INTO HUB WITH UPDATED INSURANCE INFORMATION, WANTS TO RESCHEDULE PROCEDURE WITH DR SIDHU. PLEASE CALL BACK AT THE NUMBER ABOVE.

## 2021-10-14 ENCOUNTER — TELEPHONE (OUTPATIENT)
Dept: PAIN MEDICINE | Facility: CLINIC | Age: 41
End: 2021-10-14

## 2021-10-14 NOTE — TELEPHONE ENCOUNTER
Explained to patient that this particular procedure is required to go at a certain time and a set amount of procedures of this type can go per day.   Patient verbalized understanding and elects to keep her current appointment.

## 2021-10-14 NOTE — TELEPHONE ENCOUNTER
Provider: DR SIDHU    Caller: ROSCOE ARNOLD    Relationship to Patient: SELF    Phone Number: [phone]    Reason for Call: PT NEEDS TO RESCHEDULE PROCEDURE W/ DR SIDHU ON 10/20, EITHER NEEDS A LATER TIME OR A DIFFERENT DAY. PLEASE CALL HER BACK AT THE NUMBER ABOVE.

## 2021-10-18 DIAGNOSIS — G89.4 CHRONIC PAIN SYNDROME: Primary | ICD-10-CM

## 2021-10-20 ENCOUNTER — OUTSIDE FACILITY SERVICE (OUTPATIENT)
Dept: PAIN MEDICINE | Facility: CLINIC | Age: 41
End: 2021-10-20

## 2021-10-20 PROCEDURE — 99152 MOD SED SAME PHYS/QHP 5/>YRS: CPT | Performed by: ANESTHESIOLOGY

## 2021-10-20 PROCEDURE — 64633 DESTROY CERV/THOR FACET JNT: CPT | Performed by: ANESTHESIOLOGY

## 2021-10-20 PROCEDURE — 64634 DESTROY C/TH FACET JNT ADDL: CPT | Performed by: ANESTHESIOLOGY

## 2021-10-21 ENCOUNTER — TELEPHONE (OUTPATIENT)
Dept: PAIN MEDICINE | Facility: CLINIC | Age: 41
End: 2021-10-21

## 2021-10-21 NOTE — TELEPHONE ENCOUNTER
Spoke with the patient to ask how she is doing after her procedure yesterday. She stated that she is doing good but is sore, she is using ice to help with that. Advised her of her follow up appointment reminder, she verbalized a good understanding.

## 2022-04-27 NOTE — PROGRESS NOTES
"Chief Complaint: \"The rhizotomy worked well.\"      History of Present Illness:   Patient: Ms. Leticia Farooq, 41 y.o. female originally referred by Dr. Sorenson in consultation for chronic intractable neck pain and occipital headaches.  Patient reports a longstanding history of neck pain associated with headaches for many years.  She has a history of ACDF at C5-C6 with plate cage and allograft by Dr. Tato Riley on 11/26/2020.  Unfortunately she is continue to experience neck pain and headaches.  She was last seen on October 20, 2021, when she underwent bilateral cervical medial branch rhizotomies at C3, C4, and C5, from which she reports experiencing 75% pain relief and functional improvement lasting two months, with 50% pain relief lasting two months, with an additional 40% ongoing pain relief.  She has began to feel a recurrence of her symptoms.  She also reports her daily headaches have became less severe and intense.  She did begin physical therapy and has completed 10-12 sessions.  She tells me she underwent some biofeedback with a local psychologist in her area.  She returns today for post procedure follow-up and evaluation.  Pain Description: Intermittent posterior neck and bilateral occipital pain, described as aching and sharp sensation.   Radiation of Pain: The pain radiates from the posterior cervical region into the occipital region and into the forehead causing headaches   Pain intensity today: 8/10  Average pain intensity last week: 5/10  Pain intensity ranges from: 3/10 to 8/10  Aggravating factors: Pain increases with extension, rotation of the cervical spine   Alleviating factors: Pain decreases with lying down with an ice-pack  Associated Symptoms:   Patient denies pain, numbness, or weakness in the upper and lower extremities.   Patient denies any new bladder or bowel problems.   Patient denies difficulties with her balance or recent falls.   Patient reports daily bilateral cervicogenic and " occipital headaches lasting all day with less severity and intensity.      Review of previous therapies and additional medical records:  Leticia Farooq has already failed the following measures, including:   Conservative Measures: Oral analgesics, opioids, topical analgesics, ice, heat, TENs, chiropractic therapy, massage, physical therapy   Interventional Measures: Cervical epidural steroid injections with Dr. CLINT Jansen (prior to surgery). Patient undergoes Botox therapy every 3 months for treatment of her migraines. Trigger point injections, IV infusions twice weekly  10/20/2021: Bilateral cervical RFA  Surgical Measures: On 11/16/2020: Anterior cervical discectomy and fusion C5-C6 with plate, cage and allograft by Dr. Tato Riley. No history of previous shoulder surgery   Leticia Farooq underwent orthopedic surgical consultation with Dr. Riley about six months ago and was found not to be a surgical candidate.  Leticia Farooq presents with significant comorbidities including migraines, anxiety and depression, obesity, chronic constipation.  Patient takes Eliquis for history of DVT associated with PICC line  In terms of current analgesics, Leticai Farooq takes: Propranolol, dihydroergotamine, Depakote, baclofen, Toradol, Keppra, Robaxin, cyclobenzaprine. Patient also takes BuSpar, diphenhydramine, Lamictal, ondansetron, paliperidone, prochlorperazine, promethazine, lithium  I have reviewed [facility] Report is consistent with medication reconciliation.  SOAPP: Not completed by the patient    Global Pain Scale 08-31  2021          Pain 18          Feelings 12          Clinical outcomes 10          Activities 4          GPS Total: 44            Review of Diagnostic Studies:    MRI of the cervical spine without contrast on 7/16/2021: Imaging was reviewed.  Evidence of previous anterior fusion at C5-C6.  The craniocervical junction appears unremarkable.  C1-C2, C2-C3, C3-C4, C4-C5: No significant canal or  foraminal stenosis  C5-C6: Metal artifact from anterior plate and screws.  Posterior disc spur complex results in mild canal stenosis and mild left neuroforaminal stenosis  C6-C7: Posterior lateral spurring results in mild left neuroforaminal stenosis  C7-T1: Posterior left for disc protrusion.  Mild left foraminal stenosis    Review of Systems   Constitutional: Positive for fatigue.   Psychiatric/Behavioral: The patient is nervous/anxious (depression).    All other systems reviewed and are negative.        Patient Active Problem List   Diagnosis   • Cervical stenosis of spinal canal, s/p ACDF   • Migraines   • Anxiety and depression   • Obesity   • Postoperative pain   • Bilateral occipital neuralgia   • Cervical spondylosis without myelopathy   • History of fusion of cervical spine   • Intractable chronic migraine without aura and without status migrainosus   • TMJ dysfunction   • Morbid obesity with BMI of 40.0-44.9, adult (HCC)   • Bruxism       Past Medical History:   Diagnosis Date   • Anxiety and depression    • Constipation    • Contact lens/glasses fitting    • Migraines    • PONV (postoperative nausea and vomiting)    • Spinal headache          Past Surgical History:   Procedure Laterality Date   • ANTERIOR CERVICAL DISCECTOMY W/ FUSION N/A 11/16/2020    Procedure: ANTERIOR CERVICAL DISCECTOMY AND FUSION C5-6 WITH PLATE, CAGE AND ALLOGRAFT;  Surgeon: Tato Riley MD;  Location: [facility] [place];  Service: Orthopedic Spine;  Laterality: N/A;   • CHOLECYSTECTOMY     • TIBIA FRACTURE SURGERY      HARDWARE IN ANKLE         Family History   Problem Relation Age of Onset   • Cancer Mother    • Arthritis Father    • Hypertension Father    • Arthritis Brother          Social History     Socioeconomic History   • Marital status:    Tobacco Use   • Smoking status: Never Smoker   • Smokeless tobacco: Never Used   Vaping Use   • Vaping Use: Never used   Substance and Sexual Activity   • Alcohol use: Never   • Drug  use: Never   • Sexual activity: Defer          Current Outpatient Medications:   •  baclofen (LIORESAL) 10 MG tablet, TAKE 1 TABLET BY MOUTH THREE TIMES A DAY AS NEEDED FOR HEADACHE/NECKACHE, Disp: , Rfl:   •  busPIRone (BUSPAR) 30 MG tablet, Take 30 mg by mouth 2 (Two) Times a Day., Disp: , Rfl:   •  dihydroergotamine (DHE) 1 MG/ML injection, Infuse 1 mg into a venous catheter Every 8 (Eight) Hours As Needed for Migraine., Disp: , Rfl:   •  dihydroergotamine in sodium chloride 0.9 % 50 mL IVPB, Infuse 1 mg into a venous catheter As Needed., Disp: , Rfl:   •  diphenhydrAMINE (BENADRYL) 50 MG/ML injection, Inject 50 mg (1 mL) into the muscle every 12 hours as needed for headache. Limit 2 doses/day, 2 days/week. Be aware of sedation., Disp: , Rfl:   •  diphenhydrAMINE (BENADRYL) 50 MG/ML injection, PLEASE SEE ATTACHED FOR DETAILED DIRECTIONS, Disp: , Rfl:   •  diphenhydrAMINE (BENADRYL), Infuse 25 mg into a venous catheter 2 (Two) Times a Week., Disp: , Rfl:   •  divalproex (DEPAKOTE) 250 MG 24 hr tablet, Take 250 mg by mouth At Night As Needed (HA)., Disp: , Rfl:   •  ketorolac (TORADOL) 10 MG tablet, , Disp: , Rfl:   •  ketorolac (TORADOL) 30 MG/ML injection, INJECT 2 ML (60 MG) INTO THE MUSCLE TWICE A DAY AS NEEDED FOR SEVERE HEADACHE. LIMIT 10 DOSES/MONTH., Disp: , Rfl:   •  l-methylfolate 15 MG tablet tablet, Take 15 mg by mouth Daily., Disp: , Rfl:   •  lamoTRIgine (LaMICtal) 100 MG tablet, Take 100 mg by mouth 2 (Two) Times a Day., Disp: , Rfl:   •  lamoTRIgine (LaMICtal) 25 MG tablet, Take 25 mg by mouth 2 (Two) Times a Day., Disp: , Rfl:   •  levETIRAcetam (KEPPRA) 250 MG tablet, , Disp: , Rfl:   •  lithium (ESKALITH) 450 MG CR tablet, Take 450 mg by mouth 2 (Two) Times a Day., Disp: , Rfl:   •  methocarbamol (ROBAXIN) 500 MG tablet, , Disp: , Rfl:   •  omeprazole (priLOSEC) 40 MG capsule, Take 40 mg by mouth Daily., Disp: , Rfl:   •  OnabotulinumtoxinA (BOTOX IJ), Inject 1 each as directed Every 3 (Three)  "Months., Disp: , Rfl:   •  ondansetron (ZOFRAN) 4 MG/5ML solution, Take 4 mg by mouth 2 (Two) Times a Week., Disp: , Rfl:   •  paliperidone (INVEGA) 6 MG 24 hr tablet, Take 6 mg by mouth Every Evening., Disp: , Rfl:   •  prochlorperazine (COMPAZINE) 10 MG tablet, Take 10 mg by mouth Every 8 (Eight) Hours As Needed for Nausea or Vomiting (HA)., Disp: , Rfl:   •  prochlorperazine (COMPAZINE) 10 MG/2ML injection, Inject 10mg (2 mL) into the muscle once every 8-12 hours as needed for severe headache and/or nausea. Take with benadryl. Limit 2 doses/day and max 8 days/month. Do not take within 24 hours of oral compazine., Disp: , Rfl:   •  prochlorperazine (COMPAZINE) 10 MG/2ML injection, PLEASE SEE ATTACHED FOR DETAILED DIRECTIONS, Disp: , Rfl:   •  promethazine (PHENERGAN) 25 MG tablet, Take 25 mg by mouth 2 (Two) Times a Day As Needed for Nausea or Vomiting., Disp: , Rfl:   •  promethazine (PHENERGAN), Infuse 12.5 mg into a venous catheter 2 (Two) Times a Week., Disp: , Rfl:   •  propranolol (INDERAL) 40 MG tablet, Take 80 mg by mouth 3 (Three) Times a Day., Disp: , Rfl:   •  valproate in sodium chloride 0.9 % 100 mL IVPB, Infuse 500 mg into a venous catheter 2 (Two) Times a Week., Disp: , Rfl:   •  cyclobenzaprine (FLEXERIL) 10 MG tablet, Take 10 mg by mouth Daily As Needed for Muscle Spasms. 1/2 TO 1 TABLET AS NEEDED FOR NECK PAIN, Disp: , Rfl:   •  lamoTRIgine (LaMICtal) 200 MG tablet, Take 200 mg by mouth Daily., Disp: , Rfl:       No Known Allergies      /75   Pulse 78   Temp 97.5 °F (36.4 °C)   Ht 152.4 cm (60\")   Wt 94.2 kg (207 lb 9.6 oz)   SpO2 97%   BMI 40.54 kg/m²       Physical Exam:  Constitutional: Patient appears well-developed, well-nourished, well-hydrated  HEENT: Head: Normocephalic and atraumatic  Eyes: Conjunctivae and lids are normal  Pupils: Equal, round, reactive to light  TMJ: Mandibular opening normal. Opening pattern abnormal with deviation toward the right. There is crepitus. " Palpation of the joint in the ear canal while the patient opens and closes the mandible reveals abnormal condylar movement with tenderness.   Neck: Trachea normal. Neck supple. No JVD present.   Lymphatic: No cervical adenopathy  Musculoskeletal   Gait and station: Gait evaluation demonstrated a normal gait.    Cervical spine: Passive and active range of motion are improved with some limitations with pain. Extension, flexion, lateral flexion, rotation of the cervical spine increased and reproduced pain. Cervical facet joint loading maneuvers are positive at C2-C3, C3-C4, C4-C5.  Palpation at the occipital and suboccipital regions reproduces pain.  Muscles: Presence of active trigger points: None today  Shoulders: The range of motion of the glenohumeral joints is full and without pain. Rotator cuff strength is 5/5.   Neurological:   Patient is alert and oriented to person, place, and time.   Speech: Normal.   Cortical function: Normal mental status.   Cranial nerves 2-12: intact.   Reflex Scores:  Right brachioradialis: 1+  Left brachioradialis: 1+  Right biceps: 1+  Left biceps: 1+  Right triceps: 1+  Left triceps: 1+  Right patellar: 0+  Left patellar: 0+  Right Achilles: 0+  Left Achilles: 0+  Motor strength: 5/5  Motor Tone: Normal .   Involuntary movements: None.   Superficial/Primitive Reflexes: Primitive reflexes were absent.   Right Franklin: Absent  Left Franklin: Absent  Right ankle clonus: Absent  Left ankle clonus: Absent   Babinsky: Absent  Spurling sign: Negative. Neck tornado test: Negative. Lhermitte sign: Negative. Long tract signs: Negative.   Sensory exam: Intact to light touch, intact pain and temperature sensation, intact vibration sensation and normal proprioception.   Coordination: Normal finger to nose and heel to shin. Normal balance and negative Romberg's sign   Skin and subcutaneous tissue: Skin is warm and intact. No rash noted. No cyanosis.   Psychiatric: Judgment and insight: Normal.  Recent and remote memory: Intact. Mood and affect: Normal.     ASSESSMENT:   1. Cervical spondylosis without myelopathy    2. Bilateral occipital neuralgia    3. History of fusion of cervical spine    4. Bruxism    5. TMJ dysfunction    6. Morbid obesity with BMI of 40.0-44.9, adult (HCC)    7. Intractable chronic migraine without aura and without status migrainosus    8. Anxiety and depression          PLAN/MEDICAL DECISION MAKING: Patient reports a longstanding history of neck pain and headaches for several years. Patient reports a longstanding history of neck pain associated with headaches for many years.  She has a history of ACDF at C5-C6 with plate cage and allograft by Dr. Tato Riley on 11/26/2020.  Unfortunately she is continue to experience neck pain and headaches.   Her history is complicated by a longstanding history of migraine headaches, axial/mechanical cervicalgia, and occipital neuralgia.  Her history is quite complex.  Leticia Farooq underwent orthopedic surgical consultation with Dr. Riley about six months ago and was found not to be a surgical candidate. MRI of the cervical spine from July 2021 revealed evidence of previous ACDF C5-C6.  No significant canal or foraminal stenosis.  There is some facet and uncovertebral hypertrophy above the level of the cervical fusion. Patient has failed to obtain pain relief with conservative measures including oral analgesics, physical therapy, chiropractic therapy, to name a few.  Patient undergoes trigger point injections, IV infusions, to name a few about twice weekly.  She undergoes Botox therapy every 3 months.  She has responded remarkably well to cervical rhizotomy as referenced above.  Patient has failed to obtain pain relief with conservative measures, interventional pain management measures, and previous surgical intervention, as referenced above. I have reviewed all available patient's medical records as well as previous therapies as referenced  above. I had a lengthy conversation with Ms. Leticia Farooq regarding her chronic pain condition and potential therapeutic options including risks, benefits, alternative therapies, to name a few. Therefore, I have proposed the following plan:  1. Diagnostic studies: X-rays of the cervical spine full views including flexion and extension  2. Pharmacological measures: Reviewed and discussed; Patient takes Propranolol, dihydroergotamine, Depakote, baclofen, Toradol, Keppra, Robaxin, Nurtec. Patient also takes diphenhydramine, Lamictal, ondansetron, paliperidone, prochlorperazine, promethazine   3. Interventional pain management measures: Patient will be scheduled for bilateral cervical medial branch rhizotomies.  We have also discussed the possibility of diagnostic and therapeutic bilateral greater occipital nerve blocks by hydrodissection technique under ultrasound and fluoroscopic guidance.  Patient has been found not to be a surgical candidate.  She could be a candidate for a spinal cord stimulator trial with [employer].  An occipital nerve stimulator trial would be ideal for her chronic pain, unfortunately this is not usually covered by insurance.    4. Long-term rehabilitation efforts:  A. The patient does not have a history of falls. I did complete a risk assessment for falls  B. Patient will start a comprehensive physical therapy program for ultrasound, myofascial release, cupping and dry needling, posture/position body mechanics, range of motion  C. Start an exercise program such as water therapy and daily walks for fitness  D. Contrast therapy: Apply ice-packs for 15-20 minutes, followed by heating pads for 15-20 minutes to affected area   E. Referral to Dr. Jay Mace for psychological screening for spinal cord stimulation and intrathecal therapies, cognitive behavioral therapy and biofeedback.  F. Patient counseled on the importance of weight loss to help with overall health and pain control. Patient  instructed to attempt weight loss.  She utilizes weight watchers and has lost about 35 pounds in the last year  G. I have encouraged the patient to wear her mouth appliance/bite guard every day  H.  She sees a therapist regularly at [facility]  5. The patient has been instructed to contact my office with any questions or difficulties. The patient understands the plan and agrees to proceed accordingly.      Patient Care Team:  Tato Sapp MD as PCP - General (General Practice)     No orders of the defined types were placed in this encounter.        Future Appointments   Date Time Provider Department Center   6/6/2022  9:00 AM Soy Nevarez MD [facility]

## 2022-04-28 ENCOUNTER — OFFICE VISIT (OUTPATIENT)
Dept: PAIN MEDICINE | Facility: CLINIC | Age: 42
End: 2022-04-28

## 2022-04-28 VITALS
OXYGEN SATURATION: 97 % | DIASTOLIC BLOOD PRESSURE: 75 MMHG | SYSTOLIC BLOOD PRESSURE: 102 MMHG | BODY MASS INDEX: 40.76 KG/M2 | HEART RATE: 78 BPM | HEIGHT: 60 IN | WEIGHT: 207.6 LBS | TEMPERATURE: 97.5 F

## 2022-04-28 DIAGNOSIS — F41.9 ANXIETY AND DEPRESSION: ICD-10-CM

## 2022-04-28 DIAGNOSIS — M47.812 CERVICAL SPONDYLOSIS WITHOUT MYELOPATHY: Primary | ICD-10-CM

## 2022-04-28 DIAGNOSIS — G43.719 INTRACTABLE CHRONIC MIGRAINE WITHOUT AURA AND WITHOUT STATUS MIGRAINOSUS: ICD-10-CM

## 2022-04-28 DIAGNOSIS — M47.812 CERVICAL SPONDYLOSIS WITHOUT MYELOPATHY: ICD-10-CM

## 2022-04-28 DIAGNOSIS — E66.01 MORBID OBESITY WITH BMI OF 40.0-44.9, ADULT: ICD-10-CM

## 2022-04-28 DIAGNOSIS — M26.609 TMJ DYSFUNCTION: ICD-10-CM

## 2022-04-28 DIAGNOSIS — F45.8 BRUXISM: ICD-10-CM

## 2022-04-28 DIAGNOSIS — M54.81 BILATERAL OCCIPITAL NEURALGIA: ICD-10-CM

## 2022-04-28 DIAGNOSIS — Z98.1 HISTORY OF FUSION OF CERVICAL SPINE: ICD-10-CM

## 2022-04-28 DIAGNOSIS — F32.A ANXIETY AND DEPRESSION: ICD-10-CM

## 2022-04-28 PROCEDURE — 99213 OFFICE O/P EST LOW 20 MIN: CPT | Performed by: NURSE PRACTITIONER

## 2022-04-28 RX ORDER — PROCHLORPERAZINE EDISYLATE 5 MG/ML
INJECTION INTRAMUSCULAR; INTRAVENOUS
COMMUNITY
Start: 2022-04-06

## 2022-04-28 RX ORDER — KETOROLAC TROMETHAMINE 30 MG/ML
INJECTION, SOLUTION INTRAMUSCULAR; INTRAVENOUS
COMMUNITY
Start: 2022-04-07

## 2022-04-28 RX ORDER — PROCHLORPERAZINE EDISYLATE 5 MG/ML
INJECTION INTRAMUSCULAR; INTRAVENOUS
COMMUNITY
Start: 2022-04-08

## 2022-04-28 RX ORDER — LAMOTRIGINE 25 MG/1
25 TABLET ORAL 2 TIMES DAILY
COMMUNITY
Start: 2022-03-15

## 2022-04-28 RX ORDER — LITHIUM CARBONATE 450 MG
450 TABLET, EXTENDED RELEASE ORAL 2 TIMES DAILY
COMMUNITY
Start: 2022-04-13

## 2022-04-28 RX ORDER — DIPHENHYDRAMINE HYDROCHLORIDE 50 MG/ML
INJECTION INTRAMUSCULAR; INTRAVENOUS
COMMUNITY
Start: 2022-03-08 | End: 2022-12-07

## 2022-04-28 RX ORDER — LAMOTRIGINE 100 MG/1
100 TABLET ORAL 2 TIMES DAILY
COMMUNITY
Start: 2022-04-16

## 2022-04-28 RX ORDER — DIPHENHYDRAMINE HYDROCHLORIDE 50 MG/ML
INJECTION INTRAMUSCULAR; INTRAVENOUS
COMMUNITY
Start: 2022-04-06

## 2022-05-31 DIAGNOSIS — M47.812 CERVICAL SPONDYLOSIS WITHOUT MYELOPATHY: Primary | ICD-10-CM

## 2022-06-06 ENCOUNTER — OUTSIDE FACILITY SERVICE (OUTPATIENT)
Dept: PAIN MEDICINE | Facility: CLINIC | Age: 42
End: 2022-06-06

## 2022-06-06 PROCEDURE — 64634 DESTROY C/TH FACET JNT ADDL: CPT | Performed by: ANESTHESIOLOGY

## 2022-06-06 PROCEDURE — 64633 DESTROY CERV/THOR FACET JNT: CPT | Performed by: ANESTHESIOLOGY

## 2022-06-06 PROCEDURE — 99152 MOD SED SAME PHYS/QHP 5/>YRS: CPT | Performed by: ANESTHESIOLOGY

## 2022-06-07 ENCOUNTER — TELEPHONE (OUTPATIENT)
Dept: PAIN MEDICINE | Facility: CLINIC | Age: 42
End: 2022-06-07

## 2022-06-07 NOTE — TELEPHONE ENCOUNTER
Spoke with pt regarding yesterday’s procedure with Dr. Nevarez. Pt stated they are doing well, with no complaints. Advised of f/u appointment. Pt understood, and no further needs expressed

## 2022-12-07 ENCOUNTER — OFFICE VISIT (OUTPATIENT)
Dept: PAIN MEDICINE | Facility: CLINIC | Age: 42
End: 2022-12-07

## 2022-12-07 DIAGNOSIS — G43.719 INTRACTABLE CHRONIC MIGRAINE WITHOUT AURA AND WITHOUT STATUS MIGRAINOSUS: ICD-10-CM

## 2022-12-07 DIAGNOSIS — F45.8 BRUXISM: ICD-10-CM

## 2022-12-07 DIAGNOSIS — F41.9 ANXIETY AND DEPRESSION: ICD-10-CM

## 2022-12-07 DIAGNOSIS — M26.609 TMJ DYSFUNCTION: ICD-10-CM

## 2022-12-07 DIAGNOSIS — M54.81 BILATERAL OCCIPITAL NEURALGIA: ICD-10-CM

## 2022-12-07 DIAGNOSIS — M47.812 CERVICAL SPONDYLOSIS WITHOUT MYELOPATHY: ICD-10-CM

## 2022-12-07 DIAGNOSIS — F32.A ANXIETY AND DEPRESSION: ICD-10-CM

## 2022-12-07 DIAGNOSIS — Z98.1 HISTORY OF FUSION OF CERVICAL SPINE: ICD-10-CM

## 2022-12-07 DIAGNOSIS — E66.01 MORBID OBESITY WITH BMI OF 40.0-44.9, ADULT: ICD-10-CM

## 2022-12-07 PROCEDURE — 99441 PR PHYS/QHP TELEPHONE EVALUATION 5-10 MIN: CPT | Performed by: NURSE PRACTITIONER

## 2022-12-07 NOTE — PROGRESS NOTES
"Type of Service: Consult via telemedicine  Basis for telemedicine: COVID-19 pandemic/federally declared state of public health emergency  Mode of transmission: Telephone (patient's preference).  Telemedicine Provider: RHONA Steen  Location of Physician: Office   Patient location: Home   You have chosen to receive care through a telephone visit today. Do you consent to use a telephone visit for your medical care today?   Yes       Chief Complaint: \"Neck Pain.\"      History of Present Illness:   Patient: Ms. Leticia Farooq, 42 y.o. female originally referred by Dr. Sorenson in consultation for chronic intractable neck pain and occipital headaches.  Patient reports a longstanding history of neck pain associated with headaches for many years.  She has a history of ACDF at C5-C6 with plate cage and allograft by Dr. Tato Riley on 11/26/2020.  Unfortunately she continued to experience neck pain and headaches.  She was last seen on June 6 2022, when she underwent bilateral cervical medial branch rhizotomies at C3, C4, and C5, from which she reports experiencing 75% pain relief and functional improvement lasting, 4 months, with an additional 60% pain relief and functional improvement that is ongoing.  Additionally, on October 20, 2021, she underwent bilateral cervical medial branch rhizotomies at C3, C4, and C5, from which she reports experiencing 75% pain relief and functional improvement lasting two months, with 50% pain relief lasting two months, with an additional 40% lasting several months. She continues to report her daily headaches have became less severe and intense.  She completed a comprehensive physical therapy program last year.  She tells me she underwent some biofeedback with a local psychologist in her area.  She requests consultation today for post procedure follow-up and evaluation.  Pain Description: Intermittent posterior neck and bilateral occipital pain, described as aching and sharp " sensation.   Radiation of Pain: The pain radiates from the posterior cervical region into the occipital region and into the forehead causing headaches   Pain intensity today: 3/10  Average pain intensity last week: 3/10  Pain intensity ranges from: 3/10 to 4/10  Aggravating factors: Pain increases with extension, rotation of the cervical spine   Alleviating factors: Pain decreases with lying down with an ice-pack  Associated Symptoms:   Patient denies pain, numbness, or weakness in the upper and lower extremities.   Patient denies any new bladder or bowel problems.   Patient denies difficulties with her balance or recent falls.   Patient reports daily bilateral cervicogenic and occipital headaches lasting all day with less severity and intensity.      Review of previous therapies and additional medical records:  Leticia Farooq has already failed the following measures, including:   Conservative Measures: Oral analgesics, opioids, topical analgesics, ice, heat, TENs, chiropractic therapy, massage, physical therapy   Interventional Measures: Cervical epidural steroid injections with Dr. CLINT Jansen (prior to surgery). Patient undergoes Botox therapy every 3 months for treatment of her migraines. Trigger point injections, IV infusions twice weekly  10/20/2021: Bilateral cervical RFA  06/06/2022: Bilateral cervical RFA  Surgical Measures: On 11/16/2020: Anterior cervical discectomy and fusion C5-C6 with plate, cage and allograft by Dr. Tato Riley. No history of previous shoulder surgery   Leticia Farooq underwent orthopedic surgical consultation with Dr. Riley about six months ago and was found not to be a surgical candidate.  Leticia Farooq presents with significant comorbidities including migraines, anxiety and depression, obesity, chronic constipation.  Patient takes Eliquis for history of DVT associated with PICC line  In terms of current analgesics, Leticia Farooq takes: Propranolol, dihydroergotamine,  Toradol, Keppra, Robaxin, cyclobenzaprine. Patient also takes diphenhydramine, Lamictal, ondansetron, paliperidone, prochlorperazine, promethazine, lithium  I have reviewed [facility] Report is consistent with medication reconciliation.  SOAPP: Not completed by the patient    Global Pain Scale 08-31 2021          Pain 18          Feelings 12          Clinical outcomes 10          Activities 4          GPS Total: 44            Review of Diagnostic Studies:    Cervical spine x-rays with flexion-extension views 8/31/2021: Postsurgical change from prior ACDF at C5-C6 without hardware complication.  No instability.  MRI of the cervical spine without contrast on 7/16/2021: Imaging was reviewed.  Evidence of previous anterior fusion at C5-C6.  The craniocervical junction appears unremarkable.  C1-C2, C2-C3, C3-C4, C4-C5: No significant canal or foraminal stenosis  C5-C6: Metal artifact from anterior plate and screws.  Posterior disc spur complex results in mild canal stenosis and mild left neuroforaminal stenosis  C6-C7: Posterior lateral spurring results in mild left neuroforaminal stenosis  C7-T1: Posterior left for disc protrusion.  Mild left foraminal stenosis    Review of Systems   Constitutional: Positive for fatigue.   Psychiatric/Behavioral: The patient is nervous/anxious (depression).    All other systems reviewed and are negative.        Patient Active Problem List   Diagnosis   • Cervical stenosis of spinal canal, s/p ACDF   • Migraines   • Anxiety and depression   • Obesity   • Postoperative pain   • Bilateral occipital neuralgia   • Cervical spondylosis without myelopathy   • History of fusion of cervical spine   • Intractable chronic migraine without aura and without status migrainosus   • TMJ dysfunction   • Morbid obesity with BMI of 40.0-44.9, adult (HCC)   • Bruxism       Past Medical History:   Diagnosis Date   • Anxiety and depression    • Constipation    • Contact lens/glasses fitting    • Migraines    •  PONV (postoperative nausea and vomiting)    • Spinal headache          Past Surgical History:   Procedure Laterality Date   • ANTERIOR CERVICAL DISCECTOMY W/ FUSION N/A 11/16/2020    Procedure: ANTERIOR CERVICAL DISCECTOMY AND FUSION C5-6 WITH PLATE, CAGE AND ALLOGRAFT;  Surgeon: Tato Riley MD;  Location: [facility];  Service: Orthopedic Spine;  Laterality: N/A;   • CHOLECYSTECTOMY     • TIBIA FRACTURE SURGERY      HARDWARE IN ANKLE         Family History   Problem Relation Age of Onset   • Cancer Mother    • Arthritis Father    • Hypertension Father    • Arthritis Brother          Social History     Socioeconomic History   • Marital status:    Tobacco Use   • Smoking status: Never   • Smokeless tobacco: Never   Vaping Use   • Vaping Use: Never used   Substance and Sexual Activity   • Alcohol use: Never   • Drug use: Never   • Sexual activity: Defer          Current Outpatient Medications:   •  dihydroergotamine (DHE) 1 MG/ML injection, Infuse 1 mg into a venous catheter Every 8 (Eight) Hours As Needed for Migraine., Disp: , Rfl:   •  dihydroergotamine in sodium chloride 0.9 % 50 mL IVPB, Infuse 1 mg into a venous catheter As Needed., Disp: , Rfl:   •  diphenhydrAMINE (BENADRYL) 50 MG/ML injection, Inject 50 mg (1 mL) into the muscle every 12 hours as needed for headache. Limit 2 doses/day, 2 days/week. Be aware of sedation., Disp: , Rfl:   •  diphenhydrAMINE (BENADRYL), Infuse 25 mg into a venous catheter 2 (Two) Times a Week., Disp: , Rfl:   •  ketorolac (TORADOL) 10 MG tablet, , Disp: , Rfl:   •  ketorolac (TORADOL) 30 MG/ML injection, INJECT 2 ML (60 MG) INTO THE MUSCLE TWICE A DAY AS NEEDED FOR SEVERE HEADACHE. LIMIT 10 DOSES/MONTH., Disp: , Rfl:   •  l-methylfolate 15 MG tablet tablet, Take 15 mg by mouth Daily., Disp: , Rfl:   •  lamoTRIgine (LaMICtal) 100 MG tablet, Take 100 mg by mouth 2 (Two) Times a Day., Disp: , Rfl:   •  lamoTRIgine (LaMICtal) 25 MG tablet, Take 25 mg by mouth 2 (Two) Times  a Day., Disp: , Rfl:   •  levETIRAcetam (KEPPRA) 250 MG tablet, , Disp: , Rfl:   •  lithium (ESKALITH) 450 MG CR tablet, Take 450 mg by mouth 2 (Two) Times a Day., Disp: , Rfl:   •  methocarbamol (ROBAXIN) 500 MG tablet, , Disp: , Rfl:   •  omeprazole (priLOSEC) 40 MG capsule, Take 40 mg by mouth Daily., Disp: , Rfl:   •  OnabotulinumtoxinA (BOTOX IJ), Inject 1 each as directed Every 3 (Three) Months., Disp: , Rfl:   •  ondansetron (ZOFRAN) 4 MG/5ML solution, Take 4 mg by mouth 2 (Two) Times a Week., Disp: , Rfl:   •  paliperidone (INVEGA) 6 MG 24 hr tablet, Take 6 mg by mouth Every Evening., Disp: , Rfl:   •  prochlorperazine (COMPAZINE) 10 MG tablet, Take 10 mg by mouth Every 8 (Eight) Hours As Needed for Nausea or Vomiting (HA)., Disp: , Rfl:   •  prochlorperazine (COMPAZINE) 10 MG/2ML injection, Inject 10mg (2 mL) into the muscle once every 8-12 hours as needed for severe headache and/or nausea. Take with benadryl. Limit 2 doses/day and max 8 days/month. Do not take within 24 hours of oral compazine., Disp: , Rfl:   •  prochlorperazine (COMPAZINE) 10 MG/2ML injection, PLEASE SEE ATTACHED FOR DETAILED DIRECTIONS, Disp: , Rfl:   •  promethazine (PHENERGAN) 25 MG tablet, Take 25 mg by mouth 2 (Two) Times a Day As Needed for Nausea or Vomiting., Disp: , Rfl:   •  promethazine (PHENERGAN), Infuse 12.5 mg into a venous catheter 2 (Two) Times a Week., Disp: , Rfl:   •  propranolol (INDERAL) 40 MG tablet, Take 80 mg by mouth 3 (Three) Times a Day., Disp: , Rfl:   •  valproate in sodium chloride 0.9 % 100 mL IVPB, Infuse 500 mg into a venous catheter 2 (Two) Times a Week., Disp: , Rfl:       No Known Allergies      There were no vitals taken for this visit.     Due to the constraints of the telemedicine format, no physical exam was performed      Physical Exam: (Previous PE from last office visit)  Constitutional: Patient appears well-developed, well-nourished, well-hydrated  HEENT: Head: Normocephalic and  atraumatic  Eyes: Conjunctivae and lids are normal  Pupils: Equal, round, reactive to light  TMJ: Mandibular opening normal. Opening pattern abnormal with deviation toward the right. There is crepitus. Palpation of the joint in the ear canal while the patient opens and closes the mandible reveals abnormal condylar movement with tenderness.   Neck: Trachea normal. Neck supple. No JVD present.   Lymphatic: No cervical adenopathy  Musculoskeletal   Gait and station: Gait evaluation demonstrated a normal gait.    Cervical spine: Passive and active range of motion are improved with some limitations with pain. Extension, flexion, lateral flexion, rotation of the cervical spine increased and reproduced pain. Cervical facet joint loading maneuvers are positive at C2-C3, C3-C4, C4-C5.  Palpation at the occipital and suboccipital regions reproduces pain.  Muscles: Presence of active trigger points: None today  Shoulders: The range of motion of the glenohumeral joints is full and without pain. Rotator cuff strength is 5/5.   Neurological:   Patient is alert and oriented to person, place, and time.   Speech: Normal.   Cortical function: Normal mental status.   Cranial nerves 2-12: intact.   Reflex Scores:  Right brachioradialis: 1+  Left brachioradialis: 1+  Right biceps: 1+  Left biceps: 1+  Right triceps: 1+  Left triceps: 1+  Right patellar: 0+  Left patellar: 0+  Right Achilles: 0+  Left Achilles: 0+  Motor strength: 5/5  Motor Tone: Normal .   Involuntary movements: None.   Superficial/Primitive Reflexes: Primitive reflexes were absent.   Right Franklin: Absent  Left Franklin: Absent  Right ankle clonus: Absent  Left ankle clonus: Absent   Babinsky: Absent  Spurling sign: Negative. Neck tornado test: Negative. Lhermitte sign: Negative. Long tract signs: Negative.   Sensory exam: Intact to light touch, intact pain and temperature sensation, intact vibration sensation and normal proprioception.   Coordination: Normal finger to  nose and heel to shin. Normal balance and negative Romberg's sign   Skin and subcutaneous tissue: Skin is warm and intact. No rash noted. No cyanosis.   Psychiatric: Judgment and insight: Normal. Recent and remote memory: Intact. Mood and affect: Normal.     ASSESSMENT:   1. Cervical spondylosis without myelopathy    2. Bilateral occipital neuralgia    3. History of fusion of cervical spine    4. TMJ dysfunction    5. Bruxism    6. Intractable chronic migraine without aura and without status migrainosus    7. Anxiety and depression    8. Morbid obesity with BMI of 40.0-44.9, adult ([facility])          PLAN/MEDICAL DECISION MAKING: Patient reports a longstanding history of neck pain and headaches for several years. Patient reports a longstanding history of neck pain associated with headaches for many years.  She has a history of ACDF at C5-C6 with plate cage and allograft by Dr. Tato Riley on 11/26/2020.  Unfortunately she is continue to experience neck pain and headaches.   Her history is complicated by a longstanding history of migraine headaches, axial/mechanical cervicalgia, and occipital neuralgia.  Her history is quite complex.  Leticia Farooq underwent orthopedic surgical consultation with Dr. Riley about six months ago and was found not to be a surgical candidate. MRI of the cervical spine from July 2021 revealed evidence of previous ACDF C5-C6.  No significant canal or foraminal stenosis.  There is some facet and uncovertebral hypertrophy above the level of the cervical fusion. Patient has failed to obtain pain relief with conservative measures including oral analgesics, physical therapy, chiropractic therapy, to name a few.  Patient undergoes trigger point injections, IV infusions, to name a few about twice weekly.  She undergoes Botox therapy every 3 months.  She has responded remarkably well to cervical rhizotomy as referenced above.  I will set her up follow-up with me in 2 to 3 months to evaluate her  progress.  Patient has failed to obtain pain relief with conservative measures, interventional pain management measures, and previous surgical intervention, as referenced above. I have reviewed all available patient's medical records as well as previous therapies as referenced above. I had a lengthy conversation with Ms. Leticia Farooq regarding her chronic pain condition and potential therapeutic options including risks, benefits, alternative therapies, to name a few. Therefore, I have proposed the following plan:  1. Pharmacological measures: Reviewed and discussed; Patient takes Propranolol, dihydroergotamine, Toradol, Keppra, Robaxin. Patient also takes diphenhydramine, Lamictal, ondansetron, paliperidone, prochlorperazine, promethazine   2. Interventional pain management measures: None indicated this time.  Follow-up with me in about 2 to 3 months.  Depending on continued response we may consider repeating bilateral cervical medial branch rhizotomies.  We have also discussed the possibility of diagnostic and therapeutic bilateral greater occipital nerve blocks by hydrodissection technique under ultrasound and fluoroscopic guidance.  Patient has been found not to be a surgical candidate.  She could be a candidate for a spinal cord stimulator trial with [employer].  An occipital nerve stimulator trial would be ideal for her chronic pain, unfortunately this is not usually covered by insurance.    3. Long-term rehabilitation efforts:  A. The patient does not have a history of falls. I did complete a risk assessment for falls  B. Patient will start a comprehensive physical therapy program for ultrasound, myofascial release, cupping and dry needling, posture/position body mechanics, range of motion  C. Start an exercise program such as water therapy and daily walks for fitness  D. Contrast therapy: Apply ice-packs for 15-20 minutes, followed by heating pads for 15-20 minutes to affected area   E. In the case of  advanced pain therapies she will need referral to Dr. Jay Mace for psychological screening for spinal cord stimulation and intrathecal therapies, cognitive behavioral therapy and biofeedback.  F. Continue efforts at weight loss  G. Continue mouth appliance/bite guard every day  H. She sees a therapist regularly at [facility]  4. The patient has been instructed to contact my office with any questions or difficulties. The patient understands the plan and agrees to proceed accordingly.    A phone visit was used to complete this visit. Total time of discussion was 10 minutes.          Patient Care Team:  Tato Sapp MD as PCP - General (General Practice)  Soy Nevarez MD as Consulting Physician (Pain Medicine)  Serenity Tan APRN as Nurse Practitioner (Pain Medicine)     No orders of the defined types were placed in this encounter.        Future Appointments   Date Time Provider Department Center   12/7/2022  1:00 PM Serenity Tan APRN [facility]         RHONA Barrera

## 2023-02-23 ENCOUNTER — OFFICE VISIT (OUTPATIENT)
Dept: PAIN MEDICINE | Facility: CLINIC | Age: 43
End: 2023-02-23
Payer: COMMERCIAL

## 2023-02-23 DIAGNOSIS — F32.A ANXIETY AND DEPRESSION: ICD-10-CM

## 2023-02-23 DIAGNOSIS — M54.81 BILATERAL OCCIPITAL NEURALGIA: ICD-10-CM

## 2023-02-23 DIAGNOSIS — F41.9 ANXIETY AND DEPRESSION: ICD-10-CM

## 2023-02-23 DIAGNOSIS — Z98.1 HISTORY OF FUSION OF CERVICAL SPINE: ICD-10-CM

## 2023-02-23 DIAGNOSIS — M47.812 CERVICAL SPONDYLOSIS WITHOUT MYELOPATHY: Primary | ICD-10-CM

## 2023-02-23 DIAGNOSIS — M47.812 CERVICAL SPONDYLOSIS WITHOUT MYELOPATHY: ICD-10-CM

## 2023-02-23 DIAGNOSIS — M26.609 TMJ DYSFUNCTION: ICD-10-CM

## 2023-02-23 DIAGNOSIS — G43.719 INTRACTABLE CHRONIC MIGRAINE WITHOUT AURA AND WITHOUT STATUS MIGRAINOSUS: ICD-10-CM

## 2023-02-23 DIAGNOSIS — E66.01 MORBID OBESITY WITH BMI OF 40.0-44.9, ADULT: ICD-10-CM

## 2023-02-23 PROCEDURE — 99441 PR PHYS/QHP TELEPHONE EVALUATION 5-10 MIN: CPT | Performed by: NURSE PRACTITIONER

## 2023-02-23 NOTE — PROGRESS NOTES
"Type of Service: Consult via telemedicine  Basis for telemedicine: COVID-19 pandemic/federally declared state of public health emergency  Mode of transmission: Telephone (patient's preference).  Telemedicine Provider: RHONA Steen  Location of Physician: Office   Patient location: Home   You have chosen to receive care through a telephone visit today. Do you consent to use a telephone visit for your medical care today?   Yes       Chief Complaint: \"Neck Pain.\"      History of Present Illness:   Patient: Ms. Leticia Farooq, 42 y.o. female originally referred by Dr. Sorenson in consultation for chronic intractable neck pain and occipital headaches.  Patient reports a longstanding history of neck pain associated with headaches for many years.  She has a history of ACDF at C5-C6 with plate cage and allograft by Dr. Tato Riley on 11/26/2020.  Unfortunately she continued to experience neck pain and headaches.  I last saw her in follow-up consultation 3-minute telemedicine on 12/7/2022, after undergoing bilateral cervical medial branch rhizotomies at C3, C4, and C5, performed on June 6, 2022, from which she reported experiencing 75% pain relief and functional improvement lasting 4 months, with an additional 60% pain relief and functional improvement that was ongoing.  Today, she reports she continued with an ongoing 60% relief, until several weeks ago, where she has felt increasing neck pain and occurrence of her daily headaches.  She does not feel her symptoms are to previous levels as of yet. She completed a comprehensive physical therapy program last year.  She tells me she underwent some biofeedback with a local psychologist in her area.  She requests consultation today for follow-up and evaluation.  Pain Description: Constant pain with intermittent posterior neck and bilateral occipital pain, described as aching and sharp sensation.   Radiation of Pain: The pain radiates from the posterior cervical region " into the occipital region and into the forehead causing headaches   Pain intensity today: 8/10  Average pain intensity last week: 3/10  Pain intensity ranges from: 5/10 to 8/10  Aggravating factors: Pain increases with extension, rotation of the cervical spine   Alleviating factors: Pain decreases with lying down with an ice-pack  Associated Symptoms:   Patient denies pain, numbness, or weakness in the upper extremities.   Patient denies any new bladder or bowel problems.   Patient denies difficulties with her balance or recent falls.   Patient reports daily bilateral cervicogenic and occipital headaches lasting all day with less severity and intensity.      Review of previous therapies and additional medical records:  Leticia Farooq has already failed the following measures, including:   Conservative Measures: Oral analgesics, opioids, topical analgesics, ice, heat, TENs, chiropractic therapy, massage, physical therapy   Interventional Measures: Cervical epidural steroid injections with Dr. CLINT Jansen (prior to surgery). Patient undergoes Botox therapy every 3 months for treatment of her migraines. Trigger point injections, IV infusions twice weekly  10/20/2021: Bilateral cervical RFA  06/06/2022: Bilateral cervical RFA  Surgical Measures: On 11/16/2020: Anterior cervical discectomy and fusion C5-C6 with plate, cage and allograft by Dr. Tato Riley. No history of previous shoulder surgery   Leticia Farooq underwent orthopedic surgical consultation with Dr. Riley about six months ago and was found not to be a surgical candidate.  Leticia Farooq presents with significant comorbidities including migraines, anxiety and depression, obesity, chronic constipation.  Patient takes Eliquis for history of DVT associated with PICC line  In terms of current analgesics, Leticia Farooq takes: Propranolol, dihydroergotamine, Toradol, Keppra, Robaxin, cyclobenzaprine. Patient also takes diphenhydramine, Lamictal,  ondansetron, paliperidone, prochlorperazine, promethazine, lithium  I have reviewed [facility] Report is consistent with medication reconciliation.  SOAPP: Not completed by the patient    Global Pain Scale 08-31 2021          Pain 18          Feelings 12          Clinical outcomes 10          Activities 4          GPS Total: 44            Review of Diagnostic Studies:    Cervical spine x-rays with flexion-extension views 8/31/2021: Postsurgical change from prior ACDF at C5-C6 without hardware complication.  No instability.  MRI of the cervical spine without contrast on 7/16/2021: Imaging was reviewed.  Evidence of previous anterior fusion at C5-C6.  The craniocervical junction appears unremarkable.  C1-C2, C2-C3, C3-C4, C4-C5: No significant canal or foraminal stenosis  C5-C6: Metal artifact from anterior plate and screws.  Posterior disc spur complex results in mild canal stenosis and mild left neuroforaminal stenosis  C6-C7: Posterior lateral spurring results in mild left neuroforaminal stenosis  C7-T1: Posterior left for disc protrusion.  Mild left foraminal stenosis    Review of Systems   Constitutional: Positive for fatigue.   Psychiatric/Behavioral: The patient is nervous/anxious (depression).    All other systems reviewed and are negative.        Patient Active Problem List   Diagnosis   • Cervical stenosis of spinal canal, s/p ACDF   • Migraines   • Anxiety and depression   • Obesity   • Postoperative pain   • Bilateral occipital neuralgia   • Cervical spondylosis without myelopathy   • History of fusion of cervical spine   • Intractable chronic migraine without aura and without status migrainosus   • TMJ dysfunction   • Morbid obesity with BMI of 40.0-44.9, adult (HCC)   • Bruxism       Past Medical History:   Diagnosis Date   • Anxiety and depression    • Constipation    • Contact lens/glasses fitting    • Migraines    • PONV (postoperative nausea and vomiting)    • Spinal headache          Past Surgical  History:   Procedure Laterality Date   • ANTERIOR CERVICAL DISCECTOMY W/ FUSION N/A 11/16/2020    Procedure: ANTERIOR CERVICAL DISCECTOMY AND FUSION C5-6 WITH PLATE, CAGE AND ALLOGRAFT;  Surgeon: Tato Riley MD;  Location: [facility];  Service: Orthopedic Spine;  Laterality: N/A;   • CHOLECYSTECTOMY     • TIBIA FRACTURE SURGERY      HARDWARE IN ANKLE         Family History   Problem Relation Age of Onset   • Cancer Mother    • Arthritis Father    • Hypertension Father    • Arthritis Brother          Social History     Socioeconomic History   • Marital status:    Tobacco Use   • Smoking status: Never   • Smokeless tobacco: Never   Vaping Use   • Vaping Use: Never used   Substance and Sexual Activity   • Alcohol use: Never   • Drug use: Never   • Sexual activity: Defer          Current Outpatient Medications:   •  dihydroergotamine (DHE) 1 MG/ML injection, Infuse 1 mg into a venous catheter Every 8 (Eight) Hours As Needed for Migraine., Disp: , Rfl:   •  dihydroergotamine in sodium chloride 0.9 % 50 mL IVPB, Infuse 1 mg into a venous catheter As Needed., Disp: , Rfl:   •  diphenhydrAMINE (BENADRYL) 50 MG/ML injection, Inject 50 mg (1 mL) into the muscle every 12 hours as needed for headache. Limit 2 doses/day, 2 days/week. Be aware of sedation., Disp: , Rfl:   •  diphenhydrAMINE (BENADRYL), Infuse 25 mg into a venous catheter 2 (Two) Times a Week., Disp: , Rfl:   •  ketorolac (TORADOL) 10 MG tablet, , Disp: , Rfl:   •  ketorolac (TORADOL) 30 MG/ML injection, INJECT 2 ML (60 MG) INTO THE MUSCLE TWICE A DAY AS NEEDED FOR SEVERE HEADACHE. LIMIT 10 DOSES/MONTH., Disp: , Rfl:   •  l-methylfolate 15 MG tablet tablet, Take 15 mg by mouth Daily., Disp: , Rfl:   •  lamoTRIgine (LaMICtal) 100 MG tablet, Take 100 mg by mouth 2 (Two) Times a Day., Disp: , Rfl:   •  lamoTRIgine (LaMICtal) 25 MG tablet, Take 25 mg by mouth 2 (Two) Times a Day., Disp: , Rfl:   •  levETIRAcetam (KEPPRA) 250 MG tablet, , Disp: , Rfl:   •   lithium (ESKALITH) 450 MG CR tablet, Take 450 mg by mouth 2 (Two) Times a Day., Disp: , Rfl:   •  methocarbamol (ROBAXIN) 500 MG tablet, , Disp: , Rfl:   •  omeprazole (priLOSEC) 40 MG capsule, Take 40 mg by mouth Daily., Disp: , Rfl:   •  OnabotulinumtoxinA (BOTOX IJ), Inject 1 each as directed Every 3 (Three) Months., Disp: , Rfl:   •  ondansetron (ZOFRAN) 4 MG/5ML solution, Take 4 mg by mouth 2 (Two) Times a Week., Disp: , Rfl:   •  paliperidone (INVEGA) 6 MG 24 hr tablet, Take 6 mg by mouth Every Evening., Disp: , Rfl:   •  prochlorperazine (COMPAZINE) 10 MG tablet, Take 10 mg by mouth Every 8 (Eight) Hours As Needed for Nausea or Vomiting (HA)., Disp: , Rfl:   •  prochlorperazine (COMPAZINE) 10 MG/2ML injection, Inject 10mg (2 mL) into the muscle once every 8-12 hours as needed for severe headache and/or nausea. Take with benadryl. Limit 2 doses/day and max 8 days/month. Do not take within 24 hours of oral compazine., Disp: , Rfl:   •  prochlorperazine (COMPAZINE) 10 MG/2ML injection, PLEASE SEE ATTACHED FOR DETAILED DIRECTIONS, Disp: , Rfl:   •  promethazine (PHENERGAN) 25 MG tablet, Take 25 mg by mouth 2 (Two) Times a Day As Needed for Nausea or Vomiting., Disp: , Rfl:   •  promethazine (PHENERGAN), Infuse 12.5 mg into a venous catheter 2 (Two) Times a Week., Disp: , Rfl:   •  propranolol (INDERAL) 40 MG tablet, Take 80 mg by mouth 3 (Three) Times a Day., Disp: , Rfl:   •  valproate in sodium chloride 0.9 % 100 mL IVPB, Infuse 500 mg into a venous catheter 2 (Two) Times a Week., Disp: , Rfl:       No Known Allergies      There were no vitals taken for this visit.     Due to the constraints of the telemedicine format, no physical exam was performed      Physical Exam: (Previous PE from last office visit)  Constitutional: Patient appears well-developed, well-nourished, well-hydrated  HEENT: Head: Normocephalic and atraumatic  Eyes: Conjunctivae and lids are normal  Pupils: Equal, round, reactive to light  TMJ:  Mandibular opening normal. Opening pattern abnormal with deviation toward the right. There is crepitus. Palpation of the joint in the ear canal while the patient opens and closes the mandible reveals abnormal condylar movement with tenderness.   Neck: Trachea normal. Neck supple. No JVD present.   Lymphatic: No cervical adenopathy  Musculoskeletal   Gait and station: Gait evaluation demonstrated a normal gait.    Cervical spine: Passive and active range of motion are improved with some limitations with pain. Extension, flexion, lateral flexion, rotation of the cervical spine increased and reproduced pain. Cervical facet joint loading maneuvers are positive at C2-C3, C3-C4, C4-C5.  Palpation at the occipital and suboccipital regions reproduces pain.  Muscles: Presence of active trigger points: None today  Shoulders: The range of motion of the glenohumeral joints is full and without pain. Rotator cuff strength is 5/5.   Neurological:   Patient is alert and oriented to person, place, and time.   Speech: Normal.   Cortical function: Normal mental status.   Cranial nerves 2-12: intact.   Reflex Scores:  Right brachioradialis: 1+  Left brachioradialis: 1+  Right biceps: 1+  Left biceps: 1+  Right triceps: 1+  Left triceps: 1+  Right patellar: 0+  Left patellar: 0+  Right Achilles: 0+  Left Achilles: 0+  Motor strength: 5/5  Motor Tone: Normal .   Involuntary movements: None.   Superficial/Primitive Reflexes: Primitive reflexes were absent.   Right Franklin: Absent  Left Franklin: Absent  Right ankle clonus: Absent  Left ankle clonus: Absent   Babinsky: Absent  Spurling sign: Negative. Neck tornado test: Negative. Lhermitte sign: Negative. Long tract signs: Negative.   Sensory exam: Intact to light touch, intact pain and temperature sensation, intact vibration sensation and normal proprioception.   Coordination: Normal finger to nose and heel to shin. Normal balance and negative Romberg's sign   Skin and subcutaneous tissue:  Skin is warm and intact. No rash noted. No cyanosis.   Psychiatric: Judgment and insight: Normal. Recent and remote memory: Intact. Mood and affect: Normal.     ASSESSMENT:   1. Cervical spondylosis without myelopathy    2. Bilateral occipital neuralgia    3. History of fusion of cervical spine    4. TMJ dysfunction    5. Intractable chronic migraine without aura and without status migrainosus    6. Anxiety and depression    7. Morbid obesity with BMI of 40.0-44.9, adult ([facility])          PLAN/MEDICAL DECISION MAKING: Patient reports a longstanding history of neck pain and headaches for several years. Patient reports a longstanding history of neck pain associated with headaches for many years.  She has a history of ACDF at C5-C6 with plate cage and allograft by Dr. Tato Riley on 11/26/2020.  Unfortunately she is continue to experience neck pain and headaches.   Her history is complicated by a longstanding history of migraine headaches, axial/mechanical cervicalgia, and occipital neuralgia.  Her history is quite complex.  Leticia Farooq underwent orthopedic surgical consultation with Dr. Riley about six months ago and was found not to be a surgical candidate. MRI of the cervical spine from July 2021 revealed evidence of previous ACDF C5-C6.  No significant canal or foraminal stenosis.  There is some facet and uncovertebral hypertrophy above the level of the cervical fusion. Patient has failed to obtain pain relief with conservative measures including oral analgesics, physical therapy, chiropractic therapy, to name a few.  Patient undergoes trigger point injections, IV infusions, to name a few about twice weekly.  She undergoes Botox therapy every 3 months.  She has responded remarkably well to cervical rhizotomy as referenced above. Patient has failed to obtain pain relief with conservative measures, interventional pain management measures, and previous surgical intervention, as referenced above. I have reviewed  all available patient's medical records as well as previous therapies as referenced above. I had a lengthy conversation with Ms. Leticia Farooq regarding her chronic pain condition and potential therapeutic options including risks, benefits, alternative therapies, to name a few. Therefore, I have proposed the following plan:  1. Pharmacological measures: Reviewed and discussed; Patient takes Propranolol, dihydroergotamine, Toradol, Keppra, Robaxin. Patient also takes diphenhydramine, Lamictal, ondansetron, paliperidone, prochlorperazine, promethazine   2. Interventional pain management measures: Patient will be scheduled for bilateral cervical medial branch rhizotomies at C3, C4, and C5; for bilateral cervical facet joints at C3-C4 and C4-C5.  We have also discussed the possibility of diagnostic and therapeutic bilateral greater occipital nerve blocks by hydrodissection technique under ultrasound and fluoroscopic guidance.  Patient has been found not to be a surgical candidate.  She could be a candidate for a spinal cord stimulator trial with [id number].  An occipital nerve stimulator trial would be ideal for her chronic pain, unfortunately this is not usually covered by insurance.    3. Long-term rehabilitation efforts:  A. The patient does not have a history of falls. I did complete a risk assessment for falls  B. Patient will start a comprehensive physical therapy program for ultrasound, myofascial release, cupping and dry needling, posture/position body mechanics, range of motion  C. Start an exercise program such as water therapy and daily walks for fitness  D. Contrast therapy: Apply ice-packs for 15-20 minutes, followed by heating pads for 15-20 minutes to affected area   E. In the case of advanced pain therapies she will need referral to Dr. Jay Mace for psychological screening for spinal cord stimulation and intrathecal therapies, cognitive behavioral therapy and biofeedback.  F. Continue efforts at  weight loss  G. Continue mouth appliance/bite guard every day  H. She sees a therapist [facility] at [facility]  4. The patient has been instructed to contact my office with any questions or difficulties. The patient understands the plan and agrees to proceed accordingly.    A phone visit was used to complete this visit. Total time of discussion was 8 minutes.          Patient Care Team:  Tato Sapp MD as PCP - General (General Practice)  Soy Nevarez MD as Consulting Physician (Pain Medicine)  Serenity Tan APRN as Nurse Practitioner (Pain Medicine)     No orders of the defined types were placed in this encounter.        Future Appointments   Date Time Provider Department Center   2/23/2023 11:30 AM Serenity Tan APRN [facility]         RHONA Barrera

## 2023-03-06 DIAGNOSIS — M47.812 CERVICAL SPONDYLOSIS WITHOUT MYELOPATHY: Primary | ICD-10-CM

## 2023-03-08 ENCOUNTER — OUTSIDE FACILITY SERVICE (OUTPATIENT)
Dept: PAIN MEDICINE | Facility: CLINIC | Age: 43
End: 2023-03-08
Payer: COMMERCIAL

## 2023-03-08 PROCEDURE — 64633 DESTROY CERV/THOR FACET JNT: CPT | Performed by: ANESTHESIOLOGY

## 2023-03-08 PROCEDURE — 64634 DESTROY C/TH FACET JNT ADDL: CPT | Performed by: ANESTHESIOLOGY

## 2023-03-08 PROCEDURE — 99152 MOD SED SAME PHYS/QHP 5/>YRS: CPT | Performed by: ANESTHESIOLOGY

## 2023-03-09 ENCOUNTER — TELEPHONE (OUTPATIENT)
Dept: PAIN MEDICINE | Facility: CLINIC | Age: 43
End: 2023-03-09
Payer: COMMERCIAL

## 2023-03-09 NOTE — TELEPHONE ENCOUNTER
LVM for patient, advising of follow up date and time and to call us with any questions or concerns.

## 2023-09-12 ENCOUNTER — TELEMEDICINE (OUTPATIENT)
Dept: PAIN MEDICINE | Facility: CLINIC | Age: 43
End: 2023-09-12
Payer: COMMERCIAL

## 2023-09-12 DIAGNOSIS — M47.812 CERVICAL SPONDYLOSIS WITHOUT MYELOPATHY: ICD-10-CM

## 2023-09-12 DIAGNOSIS — Z98.1 HISTORY OF FUSION OF CERVICAL SPINE: ICD-10-CM

## 2023-09-12 DIAGNOSIS — F45.8 BRUXISM: ICD-10-CM

## 2023-09-12 DIAGNOSIS — G43.719 INTRACTABLE CHRONIC MIGRAINE WITHOUT AURA AND WITHOUT STATUS MIGRAINOSUS: ICD-10-CM

## 2023-09-12 DIAGNOSIS — F32.A ANXIETY AND DEPRESSION: ICD-10-CM

## 2023-09-12 DIAGNOSIS — F41.9 ANXIETY AND DEPRESSION: ICD-10-CM

## 2023-09-12 DIAGNOSIS — M26.609 TMJ DYSFUNCTION: ICD-10-CM

## 2023-09-12 DIAGNOSIS — E66.01 MORBID OBESITY WITH BMI OF 40.0-44.9, ADULT: ICD-10-CM

## 2023-09-12 DIAGNOSIS — M54.81 BILATERAL OCCIPITAL NEURALGIA: ICD-10-CM

## 2023-09-12 NOTE — PROGRESS NOTES
"Type of Service: Consult via telemedicine  Mode of transmission: Paxerat (video).  Telemedicine Provider: RHONA Steen  Location of Physician: Office   Patient location: Home   You have chosen to receive care through a video visit today. Do you consent to use video visit for your medical care today?   Yes       Chief Complaint: \"Neck Pain.\"      History of Present Illness:   Patient: Ms. Leticia Farooq, 42 y.o. female originally referred by Dr. Sorenson in consultation for chronic intractable neck pain and occipital headaches.  Patient reports a longstanding history of neck pain associated with headaches for many years.  She has a history of ACDF at C5-C6 with plate cage and allograft by Dr. Tato Riley on 11/26/2020.  Unfortunately she continued to experience neck pain and headaches.  She has responded well to minimally invasive interventional pain management procedures.  Therefore, on March 8, 2023, she underwent repeat bilateral cervical medial branch rhizotomies at C3, C4, and C5, from which she reports experiencing about 60% pain relief and functional improvement lasting 6 months, with an ongoing 40% pain relief. She does not feel her symptoms are to previous levels as of yet. She completed a comprehensive physical therapy program last year.  She tells me she underwent some biofeedback with a local psychologist in her area.  She requests consultation today for follow-up and evaluation.  Pain Description: Constant pain with intermittent posterior neck and bilateral occipital pain, described as aching and sharp sensation.   Radiation of Pain: The pain radiates from the posterior cervical region into the occipital region and into the forehead causing headaches   Pain intensity today: 4/10  Average pain intensity last week: 3/10  Pain intensity ranges from: 3/10 to 6/10  Aggravating factors: Pain increases with extension, rotation of the cervical spine   Alleviating factors: Pain decreases with lying down " with an ice-pack  Associated Symptoms:   Patient denies pain, numbness, or weakness in the upper extremities.   Patient denies any new bladder or bowel problems.   Patient denies difficulties with her balance or recent falls.   Patient reports daily bilateral cervicogenic and occipital headaches lasting all day with less severity and intensity.      Review of previous therapies and additional medical records:  Leticia Farooq has already failed the following measures, including:   Conservative Measures: Oral analgesics, opioids, topical analgesics, ice, heat, TENs, chiropractic therapy, massage, physical therapy   Interventional Measures: Cervical epidural steroid injections with Dr. CLINT Jansen (prior to surgery). Patient undergoes Botox therapy every 3 months for treatment of her migraines. Trigger point injections, IV infusions twice weekly  10/20/2021: Bilateral cervical RFA  06/06/2022: Bilateral cervical RFA  3/8/2023: Bilateral cervical RFA  Surgical Measures: On 11/16/2020: Anterior cervical discectomy and fusion C5-C6 with plate, cage and allograft by Dr. Tato Riley. No history of previous shoulder surgery   Leticia Farooq underwent orthopedic surgical consultation with Dr. Riley about six months ago and was found not to be a surgical candidate.  Leticia Farooq presents with significant comorbidities including migraines, anxiety and depression, obesity, chronic constipation.  Patient takes Eliquis for history of DVT associated with PICC line  In terms of current analgesics, Leticia Farooq takes: Propranolol, dihydroergotamine, Toradol, Keppra, Robaxin, cyclobenzaprine. Patient also takes diphenhydramine, Lamictal, ondansetron, paliperidone, prochlorperazine, promethazine, lithium  I have reviewed [facility] Report is consistent with medication reconciliation.  SOAPP: Not completed by the patient    Global Pain Scale 08-31  2021          Pain 18          Feelings 12          Clinical outcomes 10           Activities 4          GPS Total: 44            Review of Diagnostic Studies:    Cervical spine x-rays with flexion-extension views 8/31/2021: Postsurgical change from prior ACDF at C5-C6 without hardware complication.  No instability.  MRI of the cervical spine without contrast on 7/16/2021: Imaging was reviewed.  Evidence of previous anterior fusion at C5-C6.  The craniocervical junction appears unremarkable.  C1-C2, C2-C3, C3-C4, C4-C5: No significant canal or foraminal stenosis  C5-C6: Metal artifact from anterior plate and screws.  Posterior disc spur complex results in mild canal stenosis and mild left neuroforaminal stenosis  C6-C7: Posterior lateral spurring results in mild left neuroforaminal stenosis  C7-T1: Posterior left for disc protrusion.  Mild left foraminal stenosis    Review of Systems   Constitutional:  Positive for fatigue.   Psychiatric/Behavioral:  The patient is nervous/anxious (depression).    All other systems reviewed and are negative.      Patient Active Problem List   Diagnosis    Cervical stenosis of spinal canal, s/p ACDF    Migraines    Anxiety and depression    Obesity    Postoperative pain    Bilateral occipital neuralgia    Cervical spondylosis without myelopathy    History of fusion of cervical spine    Intractable chronic migraine without aura and without status migrainosus    TMJ dysfunction    Morbid obesity with BMI of 40.0-44.9, adult    Bruxism       Past Medical History:   Diagnosis Date    Anxiety and depression     Constipation     Contact lens/glasses fitting     Migraines     PONV (postoperative nausea and vomiting)     Spinal headache          Past Surgical History:   Procedure Laterality Date    ANTERIOR CERVICAL DISCECTOMY W/ FUSION N/A 11/16/2020    Procedure: ANTERIOR CERVICAL DISCECTOMY AND FUSION C5-6 WITH PLATE, CAGE AND ALLOGRAFT;  Surgeon: Ttao Riley MD;  [facility];  Service: Orthopedic Spine;  Laterality: N/A;    CHOLECYSTECTOMY      TIBIA  FRACTURE SURGERY      HARDWARE IN ANKLE         Family History   Problem Relation Age of Onset    Cancer Mother     Arthritis Father     Hypertension Father     Arthritis Brother          Social History     Socioeconomic History    Marital status:    Tobacco Use    Smoking status: Never    Smokeless tobacco: Never   Vaping Use    Vaping Use: Never used   Substance and Sexual Activity    Alcohol use: Never    Drug use: Never    Sexual activity: Defer          Current Outpatient Medications:     dihydroergotamine (DHE) 1 MG/ML injection, Infuse 1 mg into a venous catheter Every 8 (Eight) Hours As Needed for Migraine., Disp: , Rfl:     dihydroergotamine in sodium chloride 0.9 % 50 mL IVPB, Infuse 1 mg into a venous catheter As Needed., Disp: , Rfl:     diphenhydrAMINE (BENADRYL) 50 MG/ML injection, Inject 50 mg (1 mL) into the muscle every 12 hours as needed for headache. Limit 2 doses/day, 2 days/week. Be aware of sedation., Disp: , Rfl:     diphenhydrAMINE (BENADRYL), Infuse 25 mg into a venous catheter 2 (Two) Times a Week., Disp: , Rfl:     ketorolac (TORADOL) 10 MG tablet, , Disp: , Rfl:     ketorolac (TORADOL) 30 MG/ML injection, INJECT 2 ML (60 MG) INTO THE MUSCLE TWICE A DAY AS NEEDED FOR SEVERE HEADACHE. LIMIT 10 DOSES/MONTH., Disp: , Rfl:     l-methylfolate 15 MG tablet tablet, Take 15 mg by mouth Daily., Disp: , Rfl:     lamoTRIgine (LaMICtal) 100 MG tablet, Take 100 mg by mouth 2 (Two) Times a Day., Disp: , Rfl:     lamoTRIgine (LaMICtal) 25 MG tablet, Take 25 mg by mouth 2 (Two) Times a Day., Disp: , Rfl:     levETIRAcetam (KEPPRA) 250 MG tablet, , Disp: , Rfl:     lithium (ESKALITH) 450 MG CR tablet, Take 450 mg by mouth 2 (Two) Times a Day., Disp: , Rfl:     methocarbamol (ROBAXIN) 500 MG tablet, , Disp: , Rfl:     omeprazole (priLOSEC) 40 MG capsule, Take 40 mg by mouth Daily., Disp: , Rfl:     OnabotulinumtoxinA (BOTOX IJ), Inject 1 each as directed Every 3 (Three) Months., Disp: , Rfl:      ondansetron (ZOFRAN) 4 MG/5ML solution, Take 4 mg by mouth 2 (Two) Times a Week., Disp: , Rfl:     paliperidone (INVEGA) 6 MG 24 hr tablet, Take 6 mg by mouth Every Evening., Disp: , Rfl:     prochlorperazine (COMPAZINE) 10 MG tablet, Take 10 mg by mouth Every 8 (Eight) Hours As Needed for Nausea or Vomiting (HA)., Disp: , Rfl:     prochlorperazine (COMPAZINE) 10 MG/2ML injection, Inject 10mg (2 mL) into the muscle once every 8-12 hours as needed for severe headache and/or nausea. Take with benadryl. Limit 2 doses/day and max 8 days/month. Do not take within 24 hours of oral compazine., Disp: , Rfl:     prochlorperazine (COMPAZINE) 10 MG/2ML injection, PLEASE SEE ATTACHED FOR DETAILED DIRECTIONS, Disp: , Rfl:     promethazine (PHENERGAN) 25 MG tablet, Take 25 mg by mouth 2 (Two) Times a Day As Needed for Nausea or Vomiting., Disp: , Rfl:     promethazine (PHENERGAN), Infuse 12.5 mg into a venous catheter 2 (Two) Times a Week., Disp: , Rfl:     propranolol (INDERAL) 40 MG tablet, Take 80 mg by mouth 3 (Three) Times a Day., Disp: , Rfl:     valproate in sodium chloride 0.9 % 100 mL IVPB, Infuse 500 mg into a venous catheter 2 (Two) Times a Week., Disp: , Rfl:       No Known Allergies      There were no vitals taken for this visit.     Due to the constraints of the telemedicine format, no physical exam was performed      Physical Exam: (Previous PE from last office visit)  Constitutional: Patient appears well-developed, well-nourished, well-hydrated  HEENT: Head: Normocephalic and atraumatic  Eyes: Conjunctivae and lids are normal  Pupils: Equal, round, reactive to light  TMJ: Mandibular opening normal. Opening pattern abnormal with deviation toward the right. There is crepitus. Palpation of the joint in the ear canal while the patient opens and closes the mandible reveals abnormal condylar movement with tenderness.   Neck: Trachea normal. Neck supple. No JVD present.   Lymphatic: No cervical  adenopathy  Musculoskeletal   Gait and station: Gait evaluation demonstrated a normal gait.    Cervical spine: Passive and active range of motion are improved with some limitations with pain. Extension, flexion, lateral flexion, rotation of the cervical spine increased and reproduced pain. Cervical facet joint loading maneuvers are positive at C2-C3, C3-C4, C4-C5.  Palpation at the occipital and suboccipital regions reproduces pain.  Muscles: Presence of active trigger points: None today  Shoulders: The range of motion of the glenohumeral joints is full and without pain. Rotator cuff strength is 5/5.   Neurological:   Patient is alert and oriented to person, place, and time.   Speech: Normal.   Cortical function: Normal mental status.   Cranial nerves 2-12: intact.   Reflex Scores:  Right brachioradialis: 1+  Left brachioradialis: 1+  Right biceps: 1+  Left biceps: 1+  Right triceps: 1+  Left triceps: 1+  Right patellar: 0+  Left patellar: 0+  Right Achilles: 0+  Left Achilles: 0+  Motor strength: 5/5  Motor Tone: Normal .   Involuntary movements: None.   Superficial/Primitive Reflexes: Primitive reflexes were absent.   Right Franklin: Absent  Left Franklin: Absent  Right ankle clonus: Absent  Left ankle clonus: Absent   Babinsky: Absent  Spurling sign: Negative. Neck tornado test: Negative. Lhermitte sign: Negative. Long tract signs: Negative.   Sensory exam: Intact to light touch, intact pain and temperature sensation, intact vibration sensation and normal proprioception.   Coordination: Normal finger to nose and heel to shin. Normal balance and negative Romberg's sign   Skin and subcutaneous tissue: Skin is warm and intact. No rash noted. No cyanosis.   Psychiatric: Judgment and insight: Normal. Recent and remote memory: Intact. Mood and affect: Normal.     ASSESSMENT:   1. Cervical spondylosis without myelopathy    2. Bilateral occipital neuralgia    3. History of fusion of cervical spine    4. Intractable chronic  migraine without aura and without status migrainosus    5. TMJ dysfunction    6. Anxiety and depression    7. Morbid obesity with BMI of 40.0-44.9, adult    8. Bruxism          PLAN/MEDICAL DECISION MAKING: Patient reports a longstanding history of neck pain and headaches for several years. Patient reports a longstanding history of neck pain associated with headaches for many years.  She has a history of ACDF at C5-C6 with plate cage and allograft by Dr. Tato Riley on 11/26/2020.  Unfortunately she is continue to experience neck pain and headaches.   Her history is complicated by a longstanding history of migraine headaches, axial/mechanical cervicalgia, and occipital neuralgia.  Her history is quite complex.  Leticia Farooq underwent orthopedic surgical consultation with Dr. Riley about six months ago and was found not to be a surgical candidate. MRI of the cervical spine from July 2021 revealed evidence of previous ACDF C5-C6.  No significant canal or foraminal stenosis.  There is some facet and uncovertebral hypertrophy above the level of the cervical fusion. Patient has failed to obtain pain relief with conservative measures including oral analgesics, physical therapy, chiropractic therapy, to name a few.  Patient undergoes trigger point injections, IV infusions, to name a few about twice weekly.  She undergoes Botox therapy every 3 months.  She has responded remarkably well to cervical rhizotomy as referenced above. Patient has failed to obtain pain relief with conservative measures, interventional pain management measures, and previous surgical intervention, as referenced above. I have reviewed all available patient's medical records as well as previous therapies as referenced above. I had a lengthy conversation with Ms. Leticia Farooq regarding her chronic pain condition and potential therapeutic options including risks, benefits, alternative therapies, to name a few. Therefore, I have proposed the  following plan:  1. Pharmacological measures: Reviewed and discussed; Patient takes Propranolol, dihydroergotamine, Toradol, Keppra, Robaxin. Patient also takes diphenhydramine, Lamictal, ondansetron, paliperidone, prochlorperazine, promethazine   2. Interventional pain management measures: None indicated at this time.  She will keep me updated on her ongoing progress, we have discussed if her pain increases we may repeat bilateral cervical medial branch rhizotomies at C3, C4, and C5; for bilateral cervical facet joints at C3-C4 and C4-C5.  We have also discussed the possibility of diagnostic and therapeutic bilateral greater occipital nerve blocks by hydrodissection technique under ultrasound and fluoroscopic guidance.  Patient has been found not to be a surgical candidate.  She could be a candidate for a spinal cord stimulator trial with [employer].  An occipital nerve stimulator trial would be ideal for her chronic pain, unfortunately this is not usually covered by insurance.    3. Long-term rehabilitation efforts:  A. The patient does not have a history of falls. I did complete a risk assessment for falls  B. Patient will start a comprehensive physical therapy program for ultrasound, myofascial release, cupping and dry needling, posture/position body mechanics, range of motion  C. Start an exercise program such as water therapy and daily walks for fitness  D. Contrast therapy: Apply ice-packs for 15-20 minutes, followed by heating pads for 15-20 minutes to affected area   E. In the case of advanced pain therapies she will need referral to Dr. Jay Mace for psychological screening for spinal cord stimulation and intrathecal therapies, cognitive behavioral therapy and biofeedback.  F. Continue efforts at weight loss  G. Continue mouth appliance/bite guard every day  H. She [facility] a therapist [facility]  4. The patient has been instructed to contact my office with any questions or difficulties.  The patient understands the plan and agrees to proceed accordingly.    Total time of discussion was 10 minutes.      Pain Medications               ketorolac (TORADOL) 10 MG tablet     ketorolac (TORADOL) 30 MG/ML injection INJECT 2 ML (60 MG) INTO THE MUSCLE TWICE A DAY AS NEEDED FOR SEVERE HEADACHE. LIMIT 10 DOSES/MONTH.    lamoTRIgine (LaMICtal) 100 MG tablet Take 100 mg by mouth 2 (Two) Times a Day.    lamoTRIgine (LaMICtal) 25 MG tablet Take 25 mg by mouth 2 (Two) Times a Day.    levETIRAcetam (KEPPRA) 250 MG tablet     lithium (ESKALITH) 450 MG CR tablet Take 450 mg by mouth 2 (Two) Times a Day.    methocarbamol (ROBAXIN) 500 MG tablet     paliperidone (INVEGA) 6 MG 24 hr tablet Take 6 mg by mouth Every Evening.    prochlorperazine (COMPAZINE) 10 MG tablet Take 10 mg by mouth Every 8 (Eight) Hours As Needed for Nausea or Vomiting (HA).    prochlorperazine (COMPAZINE) 10 MG/2ML injection Inject 10mg (2 mL) into the muscle once every 8-12 hours as needed for severe headache and/or nausea. Take with benadryl. Limit 2 doses/day and max 8 days/month. Do not take within 24 hours of oral compazine.    prochlorperazine (COMPAZINE) 10 MG/2ML injection PLEASE SEE ATTACHED FOR DETAILED DIRECTIONS    valproate in sodium chloride 0.9 % 100 mL IVPB Infuse 500 mg into a venous catheter 2 (Two) Times a Week.              Patient Care Team:  Tato Sapp MD as PCP - General (General Practice)  Soy Nevarez MD as Consulting Physician (Pain Medicine)  Serenity Tan APRN as Nurse Practitioner (Pain Medicine)     No orders of the defined types were placed in this encounter.        No future appointments.        RHONA Barrera

## 2023-11-27 ENCOUNTER — PATIENT MESSAGE (OUTPATIENT)
Dept: PAIN MEDICINE | Facility: CLINIC | Age: 43
End: 2023-11-27
Payer: COMMERCIAL

## 2023-11-28 ENCOUNTER — TELEPHONE (OUTPATIENT)
Dept: PAIN MEDICINE | Facility: CLINIC | Age: 43
End: 2023-11-28
Payer: COMMERCIAL

## 2023-11-28 DIAGNOSIS — M47.812 CERVICAL SPONDYLOSIS WITHOUT MYELOPATHY: Primary | ICD-10-CM

## 2023-11-28 NOTE — TELEPHONE ENCOUNTER
Called patient and scheduled her for repeat bilateral cervical medial branch rhizotomies at C3, C4, and C5; for bilateral cervical facet joints at C3-C4 and C4-C5 on 12/13 with Dr. Nevarez.

## 2023-12-05 NOTE — TELEPHONE ENCOUNTER
From: Leticia Farooq  To: Serenity Tan  Sent: 11/27/2023 5:27 PM EST  Subject: Nerve Ablation     At this time I feel I need the nerve ablation. I am experiencing moderate/sever pain in my neck. How do I proceed? Thank you, Leticia Farooq

## 2023-12-13 ENCOUNTER — OUTSIDE FACILITY SERVICE (OUTPATIENT)
Dept: PAIN MEDICINE | Facility: CLINIC | Age: 43
End: 2023-12-13
Payer: COMMERCIAL

## 2023-12-13 PROCEDURE — 64633 DESTROY CERV/THOR FACET JNT: CPT | Performed by: ANESTHESIOLOGY

## 2023-12-13 PROCEDURE — 64634 DESTROY C/TH FACET JNT ADDL: CPT | Performed by: ANESTHESIOLOGY

## 2023-12-13 PROCEDURE — 99152 MOD SED SAME PHYS/QHP 5/>YRS: CPT | Performed by: ANESTHESIOLOGY

## 2023-12-21 ENCOUNTER — TELEPHONE (OUTPATIENT)
Dept: PAIN MEDICINE | Facility: CLINIC | Age: 43
End: 2023-12-21
Payer: COMMERCIAL

## 2023-12-21 NOTE — TELEPHONE ENCOUNTER
I spoke with Leticia Farooq regarding how she was feeling after her procedure on 12/13/2023 with Dr Nevarez. Pt stated that she is feeling better . Pt stated that her pain level is a 4.pt wants a telehealth appt instead of an office visit.  Advised pt of next follow up with RHONA Steen on 06/24/2024

## 2024-07-10 ENCOUNTER — OFFICE VISIT (OUTPATIENT)
Dept: PAIN MEDICINE | Facility: CLINIC | Age: 44
End: 2024-07-10
Payer: COMMERCIAL

## 2024-07-10 VITALS — BODY MASS INDEX: 34.69 KG/M2 | WEIGHT: 176.7 LBS | HEIGHT: 60 IN | OXYGEN SATURATION: 99 % | HEART RATE: 69 BPM

## 2024-07-10 DIAGNOSIS — F45.8 BRUXISM: ICD-10-CM

## 2024-07-10 DIAGNOSIS — F32.A ANXIETY AND DEPRESSION: ICD-10-CM

## 2024-07-10 DIAGNOSIS — M47.812 CERVICAL SPONDYLOSIS WITHOUT MYELOPATHY: ICD-10-CM

## 2024-07-10 DIAGNOSIS — F41.9 ANXIETY AND DEPRESSION: ICD-10-CM

## 2024-07-10 DIAGNOSIS — Z98.1 HISTORY OF FUSION OF CERVICAL SPINE: ICD-10-CM

## 2024-07-10 DIAGNOSIS — G43.719 INTRACTABLE CHRONIC MIGRAINE WITHOUT AURA AND WITHOUT STATUS MIGRAINOSUS: ICD-10-CM

## 2024-07-10 DIAGNOSIS — M54.81 BILATERAL OCCIPITAL NEURALGIA: ICD-10-CM

## 2024-07-10 PROCEDURE — 99214 OFFICE O/P EST MOD 30 MIN: CPT | Performed by: NURSE PRACTITIONER

## 2024-07-10 RX ORDER — TIZANIDINE 4 MG/1
4 TABLET ORAL
COMMUNITY
Start: 2024-01-22

## 2024-07-10 RX ORDER — LAMOTRIGINE 150 MG/1
1 TABLET ORAL EVERY 12 HOURS SCHEDULED
COMMUNITY
Start: 2024-06-30

## 2024-07-10 NOTE — PROGRESS NOTES
"Chief Complaint: \"Neck Pain and headaches.\"      History of Present Illness:   Patient: Ms. Leticia Farooq, 43 y.o. female originally referred by Dr. Sorenson in consultation for chronic intractable neck pain and occipital headaches.  Patient reports a longstanding history of neck pain associated with headaches for many years.  She has a history of ACDF at C5-C6 with plate cage and allograft by Dr. Tato Riley on 11/26/2020.  Unfortunately she continued to experience neck pain and headaches.  She has responded well to minimally invasive interventional pain management procedures.  We last saw her on December 13, 2023 when she underwent repeat bilateral cervical medial branch rhizotomies at C3, C4 and C5, from which she reports experiencing 70-75% pain relief and functional improvement lasting about 6 months.  Only recently has she began to experience recurrence of her symptoms, she notices this mostly with her headaches resurfacing.  She also reports about 60 to 70% in her occipital headaches thereafter rhizotomy.  Prior to that, on March 8, 2023, she underwent repeat bilateral cervical medial branch rhizotomies at C3, C4, and C5, from which she reported experiencing about 60% pain relief and functional improvement lasting 6 months. She completed a comprehensive physical therapy program within the last year, and has continued an ongoing physical therapist directed home exercise program.  She tells me she underwent some biofeedback with a local psychologist in her area for her headaches.  Returns today for postprocedure follow-up and evaluation.  Pain Description: Constant pain with intermittent posterior neck and bilateral occipital pain, described as aching and sharp sensation.   Radiation of Pain: The pain radiates from the posterior cervical region into the occipital region and into the forehead causing headaches   Pain intensity today: 7/10  Average pain intensity last week: 5/10  Pain intensity ranges from: " 5/10 to 7/10  Aggravating factors: Pain increases with extension, rotation of the cervical spine   Alleviating factors: Pain decreases with lying down with an ice-pack  Associated Symptoms:   Patient denies pain, numbness, or weakness in the upper extremities.   Patient denies any new bladder or bowel problems.   Patient denies difficulties with her balance or recent falls.   Patient reports daily bilateral cervicogenic and occipital headaches lasting all day with less severity and intensity.  Pain interferes with regular activities, ADLs, and affects patient's quality of life   Pain does not interfere with sleep    Stiffness      Review of previous therapies and additional medical records:  Leticia Farooq has already failed the following measures, including:   Conservative Measures: Oral analgesics, opioids, topical analgesics, ice, heat, TENs, chiropractic therapy, massage, physical therapy   Interventional Measures: Cervical epidural steroid injections with Dr. CLINT Jansen (prior to surgery). Patient undergoes Botox therapy every 3 months for treatment of her migraines. Trigger point injections, IV infusions twice weekly  10/20/2021: Bilateral cervical RFA  06/06/2022: Bilateral cervical RFA  3/8/2023: Bilateral cervical RFA  12/13/2023: Bilateral cervical RFA  Surgical Measures: On 11/16/2020: Anterior cervical discectomy and fusion C5-C6 with plate, cage and allograft by Dr. Tato Riley. No history of previous shoulder surgery   Leticia Farooq underwent orthopedic surgical consultation with Dr. Riley about and was found not to be a surgical candidate.  Leticia Farooq presents with significant comorbidities including migraines, anxiety and depression, obesity, chronic constipation.  Patient takes Eliquis for history of DVT associated with PICC line  In terms of current analgesics, Leticia Farooq takes: Propranolol, dihydroergotamine, Toradol, Keppra, Robaxin, cyclobenzaprine. Patient also takes  diphenhydramine, Lamictal, ondansetron, paliperidone, prochlorperazine, promethazine, lithium  I have reviewed [facility] Report is consistent with medication reconciliation.  SOAPP: Not completed by the patient    Global Pain Scale 08-31  2021 07-10  2024         Pain 18 15         Feelings 12 12         Clinical outcomes 10 8         Activities 4 6         GPS Total: 44 41             NECK PAIN DISABILITY INDEX QUESTIONNAIRE  DATE 07-10  2024            Pain intensity  0: No pain  1: Mild  2: Moderate  3: Fairly severe  4: very severe  5: Worst imaginable 2            Personal Care   0: I can look after myself normally without causing extra pain.  1: I can look after myself normally, but it causes extra pain.  2: It is painful to look after myself and I am slow and careful.  3: I need some help, but manage most of my personal care.  4: I need help every day in most aspects of self-care.  5: I do not get dressed; I wash with difficulty and stay in bed. 3            Lifting  0: I can lift heavy weights without extra pain.  1: I can lift heavy weights, but it gives extra pain.  2: Pain prevents me from lifting heavy weights off the floor but I can manage if they are conveniently positioned, for example, on a table.  3: Pain prevents me from lifting heavy weights, but I can manage light to medium weights if they are conveniently positioned.  4: I can lift very light weights.  5: I cannot lift or carry anything at all. 4            Reading  0: I can read as much as I want to with no pain in my neck.  1: I can read as much as I want to with slight pain in my neck.  2: I can read as much as I want to with moderate pain in my neck.  3: I cannot read as much as I want because of moderate pain in my neck.  4: I cannot read as much as I want because of severe pain in my neck.  5: I cannot read at all. 2            Headaches  0: I have no headaches at all.  1: I have slight headaches which come infrequently.  2: I have moderate  headaches which come infrequently.  3: I have moderate headaches which come frequently.  4: I have severe headaches which come frequently.  5: I have headaches almost all the time. 5            Concentration  0: I can concentrate fully when I want to with no difficulty.  1: I can concentrate fully when I want to with slight difficulty.  2: I have a fair degree of difficulty in concentrating when I want to.  3: I have a lot of difficulty in concentrating when I want to.  4: I have a great deal of difficulty in concentrating when I want to.  5: I cannot concentrate at all. 3            Work  0: I can do as much work as I want to.  1: I can only do my usual work, but no more.  2: I can do most of my usual work, but no more.  3: I cannot do my usual work.  4: I can hardly do any work at all.  5: I cannot do any work at all. 2            Driving  0: I can drive my car without any neck pain.  1: I can drive my car as long as I want with slight pain in my neck.  2: I can drive my car as long as I want with moderate pain in my   neck.  3: I cannot drive my car as long as I want because of moderate pain   in my neck.  4: I can hardly drive at all because of severe pain in my neck.  5: I cannot drive my car at all. 2            Sleeping  0: I have no trouble sleeping.  1: My sleep is slightly disturbed (less than 1 hour sleepless).  2: My sleep is mildly disturbed (1-2 hours sleepless).  3: My sleep is moderately disturbed (2-3 hours sleepless).  4: My sleep is greatly disturbed (3-5 hours sleepless).  5: My sleep is completely disturbed (5-7 hours) 1            Recreation  0: I am able to engage in all of my recreational activities with no neck pain at all.  1: I am able to engage in all of my recreational activities with some pain in my neck.  2: I am able to engage in most, but not all of my recreational activities because of pain in my neck.  3: I am able to engage in a few of my recreational activities because of pain in  my neck.  4: I can hardly do any recreational activities because of pain in my neck.  5: I cannot do any recreational activities at all. 2            TOTAL SCORE 26             Review of Diagnostic Studies:    Cervical spine x-rays with flexion-extension views 8/31/2021: Postsurgical change from prior ACDF at C5-C6 without hardware complication.  No instability.  MRI of the cervical spine without contrast on 7/16/2021: Imaging was reviewed.  Evidence of previous anterior fusion at C5-C6.  The craniocervical junction appears unremarkable.  C1-C2, C2-C3, C3-C4, C4-C5: No significant canal or foraminal stenosis  C5-C6: Metal artifact from anterior plate and screws.  Posterior disc spur complex results in mild canal stenosis and mild left neuroforaminal stenosis  C6-C7: Posterior lateral spurring results in mild left neuroforaminal stenosis  C7-T1: Posterior left for disc protrusion.  Mild left foraminal stenosis    Review of Systems   Constitutional:  Positive for fatigue.   Musculoskeletal:  Positive for neck pain and neck stiffness.   Neurological:  Positive for dizziness, weakness, light-headedness and headaches.   Psychiatric/Behavioral:  The patient is nervous/anxious (depression).    All other systems reviewed and are negative.        Patient Active Problem List   Diagnosis    Cervical stenosis of spinal canal, s/p ACDF    Migraines    Anxiety and depression    Obesity    Postoperative pain    Bilateral occipital neuralgia    Cervical spondylosis without myelopathy    History of fusion of cervical spine    Intractable chronic migraine without aura and without status migrainosus    TMJ dysfunction    Morbid obesity with BMI of 40.0-44.9, adult    Bruxism       Past Medical History:   Diagnosis Date    Anxiety and depression     Constipation     Contact lens/glasses fitting     Migraines     PONV (postoperative nausea and vomiting)     Spinal headache          Past Surgical History:   Procedure Laterality Date     ANTERIOR CERVICAL DISCECTOMY W/ FUSION N/A 11/16/2020    Procedure: ANTERIOR CERVICAL DISCECTOMY AND FUSION C5-6 WITH PLATE, CAGE AND ALLOGRAFT;  Surgeon: Tato Riley MD;  Location: [facility] [place];  Service: Orthopedic Spine;  Laterality: N/A;    CHOLECYSTECTOMY      TIBIA FRACTURE SURGERY      HARDWARE IN ANKLE         Family History   Problem Relation Age of Onset    Cancer Mother     Arthritis Father     Hypertension Father     Arthritis Brother          Social History     Socioeconomic History    Marital status:    Tobacco Use    Smoking status: Never    Smokeless tobacco: Never   Vaping Use    Vaping status: Never Used   Substance and Sexual Activity    Alcohol use: Never    Drug use: Never    Sexual activity: Defer          Current Outpatient Medications:     diphenhydrAMINE (BENADRYL) 50 MG/ML injection, Inject 50 mg (1 mL) into the muscle every 12 hours as needed for headache. Limit 2 doses/day, 2 days/week. Be aware of sedation., Disp: , Rfl:     diphenhydrAMINE (BENADRYL), Infuse 50 mL into a venous catheter 2 (Two) Times a Week., Disp: , Rfl:     ketorolac (TORADOL) 10 MG tablet, , Disp: , Rfl:     ketorolac (TORADOL) 30 MG/ML injection, INJECT 2 ML (60 MG) INTO THE MUSCLE TWICE A DAY AS NEEDED FOR SEVERE HEADACHE. LIMIT 10 DOSES/MONTH., Disp: , Rfl:     l-methylfolate 15 MG tablet tablet, Take 1 tablet by mouth Daily., Disp: , Rfl:     lamoTRIgine (LaMICtal) 150 MG tablet, Take 1 tablet by mouth Every 12 (Twelve) Hours., Disp: , Rfl:     levETIRAcetam (KEPPRA) 250 MG tablet, , Disp: , Rfl:     lithium (ESKALITH) 450 MG CR tablet, Take 1 tablet by mouth 2 (Two) Times a Day., Disp: , Rfl:     methocarbamol (ROBAXIN) 500 MG tablet, , Disp: , Rfl:     omeprazole (priLOSEC) 40 MG capsule, Take 1 capsule by mouth Daily., Disp: , Rfl:     OnabotulinumtoxinA (BOTOX IJ), Inject 1 each as directed Every 3 (Three) Months., Disp: , Rfl:     ondansetron (ZOFRAN) 4 MG/5ML solution, Take 5 mL by mouth 2 (Two)  "Times a Week., Disp: , Rfl:     prochlorperazine (COMPAZINE) 10 MG tablet, Take 1 tablet by mouth Every 8 (Eight) Hours As Needed for Nausea or Vomiting (HA)., Disp: , Rfl:     prochlorperazine (COMPAZINE) 10 MG/2ML injection, Inject 10mg (2 mL) into the muscle once every 8-12 hours as needed for severe headache and/or nausea. Take with benadryl. Limit 2 doses/day and max 8 days/month. Do not take within 24 hours of oral compazine., Disp: , Rfl:     promethazine (PHENERGAN) 25 MG tablet, Take 1 tablet by mouth 2 (Two) Times a Day As Needed for Nausea or Vomiting., Disp: , Rfl:     tiZANidine (ZANAFLEX) 4 MG tablet, Take 1 tablet by mouth every night at bedtime., Disp: , Rfl:     dihydroergotamine (DHE) 1 MG/ML injection, Infuse 1 mL into a venous catheter Every 8 (Eight) Hours As Needed for Migraine., Disp: , Rfl:     dihydroergotamine in sodium chloride 0.9 % 50 mL IVPB, Infuse 1 mg into a venous catheter As Needed., Disp: , Rfl:     lamoTRIgine (LaMICtal) 100 MG tablet, Take 1 tablet by mouth 2 (Two) Times a Day., Disp: , Rfl:     lamoTRIgine (LaMICtal) 25 MG tablet, Take 1 tablet by mouth 2 (Two) Times a Day., Disp: , Rfl:     prochlorperazine (COMPAZINE) 10 MG/2ML injection, PLEASE SEE ATTACHED FOR DETAILED DIRECTIONS, Disp: , Rfl:     promethazine (PHENERGAN), Infuse 50 mL into a venous catheter 2 (Two) Times a Week., Disp: , Rfl:     valproate in sodium chloride 0.9 % 100 mL IVPB, Infuse 500 mg into a venous catheter 2 (Two) Times a Week., Disp: , Rfl:       No Known Allergies      Pulse 69   Ht 152.4 cm (60\")   Wt 80.2 kg (176 lb 11.2 oz)   SpO2 99%   BMI 34.51 kg/m²           Physical Exam:   Constitutional: Patient appears well-developed, well-nourished, well-hydrated  HEENT: Head: Normocephalic and atraumatic  Eyes: Conjunctivae and lids are normal  Pupils: Equal, round, reactive to light  TMJ: Mandibular opening normal. Opening pattern abnormal with deviation toward the right. There is crepitus. " Palpation of the joint in the ear canal while the patient opens and closes the mandible reveals abnormal condylar movement with tenderness.   Neck: Trachea normal. Neck supple. No JVD present.   Lymphatic: No cervical adenopathy  Musculoskeletal   Gait and station: Gait evaluation demonstrated a normal gait.    Cervical spine: Passive and active range of motion are limited secondary to pain. Extension, flexion, lateral flexion, rotation of the cervical spine increased and reproduced pain. Cervical facet joint loading maneuvers are positive. Palpation at the occipital and suboccipital regions reproduces pain.  Muscles: Presence of active trigger points: None today  Shoulders: The range of motion of the glenohumeral joints is full and without pain. Rotator cuff strength is 5/5.   Neurological:   Patient is alert and oriented to person, place, and time.   Speech: Normal.   Cortical function: Normal mental status.   Cranial nerves 2-12: intact.   Reflex Scores:  Right brachioradialis: 1+  Left brachioradialis: 1+  Right biceps: 1+  Left biceps: 1+  Right triceps: 1+  Left triceps: 1+  Right patellar: 0+  Left patellar: 0+  Right Achilles: 0+  Left Achilles: 0+  Motor strength: 5/5  Motor Tone: Normal .   Involuntary movements: None.   Superficial/Primitive Reflexes: Primitive reflexes were absent.   Right Franklin: Absent  Left Franklin: Absent  Right ankle clonus: Absent  Left ankle clonus: Absent   Babinsky: Absent  Spurling sign: Negative. Neck tornado test: Negative. Lhermitte sign: Negative. Long tract signs: Negative.   Sensory exam: Intact to light touch, intact pain and temperature sensation, intact vibration sensation and normal proprioception.   Coordination: Normal finger to nose and heel to shin. Normal balance and negative Romberg's sign   Skin and subcutaneous tissue: Skin is warm and intact. No rash noted. No cyanosis.   Psychiatric: Judgment and insight: Normal. Recent and remote memory: Intact. Mood and  affect: Normal.     ASSESSMENT:   1. Cervical spondylosis without myelopathy    2. History of fusion of cervical spine    3. Bilateral occipital neuralgia    4. Intractable chronic migraine without aura and without status migrainosus    5. Bruxism    6. Anxiety and depression            PLAN/MEDICAL DECISION MAKING: Ms. Leticia Farooq, 43 y.o. female reports a longstanding history of neck pain and headaches for several years.  She has a history of ACDF at C5-C6 with plate cage and allograft by Dr. Tato Riley on 11/26/2020.  Unfortunately she  continued to experience neck pain and headaches.   Her history is complicated by a longstanding history of migraine headaches, axial/mechanical cervicalgia, and occipital neuralgia.  Her history is quite complex.  She underwent orthopedic surgical consultation with Dr. Riley and was found not to be a surgical candidate. MRI of the cervical spine from July 2021 revealed evidence of previous ACDF C5-C6.  No significant canal or foraminal stenosis.  There is some facet and uncovertebral hypertrophy above the level of the cervical fusion. Patient has failed to obtain pain relief with conservative measures including oral analgesics, physical therapy, chiropractic therapy, to name a few.  Patient undergoes trigger point injections, IV infusions, to name a few about twice weekly.  She undergoes Botox therapy every 3 months.  She has responded remarkably well to cervical rhizotomy as referenced above. Patient has failed to obtain pain relief with conservative measures, interventional pain management measures, and previous surgical intervention, as referenced above. I have reviewed all available patient's medical records as well as previous therapies as referenced above. I had a lengthy conversation with Ms. Leticia Farooq regarding her chronic pain condition and potential therapeutic options including risks, benefits, alternative therapies, to name a few. Therefore, I have  proposed the following plan:  1. Pharmacological measures: Reviewed and discussed; Patient takes Propranolol, dihydroergotamine, Toradol, Keppra, Robaxin. Patient also takes diphenhydramine, Lamictal, ondansetron, paliperidone, prochlorperazine, promethazine   2. Interventional pain management measures:  Patient will be scheduled bilateral cervical medial branch rhizotomies at C3, C4, and C5; for bilateral cervical facet joints at C3-C4 and C4-C5.  We have also discussed the possibility of diagnostic and therapeutic bilateral greater occipital nerve blocks by hydrodissection technique under ultrasound and fluoroscopic guidance.  Patient has been found not to be a surgical candidate.  She could be a candidate for a spinal cord stimulator trial with [facility].  An occipital nerve stimulator trial would be ideal for her chronic pain, unfortunately this is not usually covered by insurance.    3. Long-term rehabilitation efforts:  A. The patient does not have a history of falls. I did complete a risk assessment for falls  B. Patient will start a comprehensive physical therapy program for ultrasound, myofascial release, cupping and dry needling, posture/position body mechanics, range of motion  C. Start an exercise program such as water therapy and daily walks for fitness  D. Contrast therapy: Apply ice-packs for 15-20 minutes, followed by heating pads for 15-20 minutes to affected area   E. In the case of advanced pain therapies she will need referral to Dr. Jay Mace for psychological screening for spinal cord stimulation and intrathecal therapies, cognitive behavioral therapy and biofeedback.  F. Continue efforts at weight loss  G. Continue mouth appliance/bite guard every day  H. She sees a therapist regularly at [facility]  4. The patient has been instructed to contact my office with any questions or difficulties. The patient understands the plan and agrees to proceed accordingly.         Pain Medications                ketorolac (TORADOL) 10 MG tablet     ketorolac (TORADOL) 30 MG/ML injection INJECT 2 ML (60 MG) INTO THE MUSCLE TWICE A DAY AS NEEDED FOR SEVERE HEADACHE. LIMIT 10 DOSES/MONTH.    lamoTRIgine (LaMICtal) 150 MG tablet Take 1 tablet by mouth Every 12 (Twelve) Hours.    levETIRAcetam (KEPPRA) 250 MG tablet     lithium (ESKALITH) 450 MG CR tablet Take 1 tablet by mouth 2 (Two) Times a Day.    methocarbamol (ROBAXIN) 500 MG tablet     prochlorperazine (COMPAZINE) 10 MG tablet Take 1 tablet by mouth Every 8 (Eight) Hours As Needed for Nausea or Vomiting (HA).    prochlorperazine (COMPAZINE) 10 MG/2ML injection Inject 10mg (2 mL) into the muscle once every 8-12 hours as needed for severe headache and/or nausea. Take with benadryl. Limit 2 doses/day and max 8 days/month. Do not take within 24 hours of oral compazine.    tiZANidine (ZANAFLEX) 4 MG tablet Take 1 tablet by mouth every night at bedtime.    lamoTRIgine (LaMICtal) 100 MG tablet Take 1 tablet by mouth 2 (Two) Times a Day.    lamoTRIgine (LaMICtal) 25 MG tablet Take 1 tablet by mouth 2 (Two) Times a Day.    prochlorperazine (COMPAZINE) 10 MG/2ML injection PLEASE SEE ATTACHED FOR DETAILED DIRECTIONS    valproate in sodium chloride 0.9 % 100 mL IVPB Infuse 500 mg into a venous catheter 2 (Two) Times a Week.              Patient Care Team:  Tato Sapp MD as PCP - General (General Practice)  Soy Nevarez MD as Consulting Physician (Pain Medicine)  Serenity Tan APRN as Nurse Practitioner (Pain Medicine)     No orders of the defined types were placed in this encounter.        No future appointments.          RHONA Barrera

## 2024-07-23 DIAGNOSIS — M47.812 CERVICAL SPONDYLOSIS WITHOUT MYELOPATHY: Primary | ICD-10-CM

## 2024-07-29 ENCOUNTER — OUTSIDE FACILITY SERVICE (OUTPATIENT)
Dept: PAIN MEDICINE | Facility: CLINIC | Age: 44
End: 2024-07-29
Payer: COMMERCIAL

## 2024-07-29 ENCOUNTER — DOCUMENTATION (OUTPATIENT)
Dept: PAIN MEDICINE | Facility: CLINIC | Age: 44
End: 2024-07-29

## 2024-07-29 NOTE — PROGRESS NOTES
[facility]      PROCEDURE DATE: 07/29/2024    PREOPERATIVE DIAGNOSES   1. Cervical spondylosis without myelopathy   2. Bilateral occipital neuralgia   3. History of fusion of cervical spine   4. TMJ dysfunction   5. Intractable chronic migraine without aura and without status migrainosus   6. Anxiety and depression      POSTOPERATIVE DIAGNOSES  1. Cervical spondylosis without myelopathy   2. Bilateral occipital neuralgia   3. History of fusion of cervical spine   4. TMJ dysfunction   5. Intractable chronic migraine without aura and without status migrainosus   6. Anxiety and depression     PROCEDURE: Bilateral cervical medial branch rhizotomies at C3, C4, C5; for bilateral cervical facet joints at C3-C4 and C4-C5     ANESTHESIA: Local anesthesia plus moderate IV sedation    COMPLICATIONS: None    EBL: O    MEDICAL NECESSITY: A comprehensive evaluation including history and physical exam and pertinent physiologic and functional assessment was performed. Patient presents with intractable pain due to the diagnoses listed above. Patient has failed to respond to conservative modalities, as referenced under HPI including the impact of patient's moderate-to-severe pain contributing to significant impairment in daily activities, ADLs, and a negative impact on quality of life. Supporting diagnostic studies of patient's chronic pain condition have been reviewed. We have discussed using a stepwise approach starting with the shortest or least intense level of treatment, care, or service as determined by the extent required to diagnose and or treat a patient's condition. The treatments proposed are consistent with the patient's medical condition and are known to be as safe and effective by current guidelines and standard of care. The duration and frequency proposed are considered appropriate for the service in accordance with accepted standards of medical practice for the diagnosis and or treatment of the patient's condition and or  intended to improve the patient's level of function. We have documented outcome in physical and functional status for repeating interventions only upon return of pain and or deterioration in functional status. See OV note for additional details.    PROCEDURE SUMMARY: After explaining all the risks and benefits of the procedure, an informed consent was obtained. The patient's surgical site was confirmed with the patient and marked in the holding room accordingly. The patient was transferred to the procedure room and placed on the operating room table in prone position. Time out was completed. Noninvasive monitors were applied. Administration of moderate sedation was performed by a designated procedure room nurse, who monitored the patient's level of consciousness and physiological status. Pertinent information was reported on a sedation flow sheet, which is part of the patient's permanent medical records. Start sedation time: 07:43 AM. The patient's vital signs remained within normal limits.  The cervical spine area was prepped and draped in sterile fashion. Using C-arm fluoroscopic guidance, the waist of the articular pillars at bilateral C3, C4, C5, (targets) were identified. The skin and subcutaneous tissues overlying the targets at the above-mentioned levels were anesthetized with ten ml of 1% lidocaine followed by fourteen ml of 0.25% bupivacaine with epinephrine 1:200.000. Using 20-gauge Nymirum cannulas with 1-cm uninsulated curved tips, the cannulas were advanced to contact the targets at the above-mentioned levels. AP and contralateral oblique x-ray views were obtained and confirmed appropriate cannula placement. X-rays were obtained and scanned in the patient's chart. Aspiration was negative for blood and/or CSF. Sensory thresholds were obtained at 0.25 millivolts, and motor thresholds at 0.5 millivolts. Motor and sensory stimulation were performed up to 3 mV. At no time was there any evidence of radicular  stimulation or elicitation of paresthesias in a radicular distribution. Bilateral cervical medial branch rhizotomies were performed at C3, C4, C5, at 80 degrees Celsius for 90 seconds per cervical level. The cannulas were flushed with 1 ml of 0.25% bupivacaine with epinephrine 1:200.000 in 1% dextrose per level. Fluoroscopy was utilized using low-dose of radiation applying collimation, pulsed mode, and shielding following ALARA recommendations. Fluoroscopy time: 27 seconds. End sedation time: 07:56 AM . The patient tolerated the procedure well and without incident.  Upon completion of the procedure, the patient was transferred to the recovery room in stable condition. The patient was [facility] neurologically intact and with appropriate discharge instructions. Pain level before procedure: 9/10. Pain level after procedure: 0/10.    PLAN:  Follow-up with RHONA Steen in about 20 weeks. See OV note.  Pharmacological measures: See OV note.  Long-term rehabilitation efforts: See OV note.  The patient has been instructed to contact my office with any questions or difficulties. The patient understands the plan and agrees to proceed accordingly.      Signatures  Electronically signed by: Soy Nevarez M.D.; JULY 29, 2024, 08:21 AM EST (Author)

## 2025-01-27 ENCOUNTER — OFFICE VISIT (OUTPATIENT)
Dept: PAIN MEDICINE | Facility: CLINIC | Age: 45
End: 2025-01-27
Payer: COMMERCIAL

## 2025-01-27 VITALS — HEIGHT: 60 IN | WEIGHT: 178.1 LBS | BODY MASS INDEX: 34.96 KG/M2

## 2025-01-27 DIAGNOSIS — G43.719 INTRACTABLE CHRONIC MIGRAINE WITHOUT AURA AND WITHOUT STATUS MIGRAINOSUS: ICD-10-CM

## 2025-01-27 DIAGNOSIS — F32.A ANXIETY AND DEPRESSION: ICD-10-CM

## 2025-01-27 DIAGNOSIS — M47.812 CERVICAL SPONDYLOSIS WITHOUT MYELOPATHY: ICD-10-CM

## 2025-01-27 DIAGNOSIS — F41.9 ANXIETY AND DEPRESSION: ICD-10-CM

## 2025-01-27 DIAGNOSIS — M54.81 BILATERAL OCCIPITAL NEURALGIA: ICD-10-CM

## 2025-01-27 DIAGNOSIS — F45.8 BRUXISM: ICD-10-CM

## 2025-01-27 DIAGNOSIS — Z98.1 HISTORY OF FUSION OF CERVICAL SPINE: ICD-10-CM

## 2025-01-27 DIAGNOSIS — M26.609 TMJ DYSFUNCTION: ICD-10-CM

## 2025-01-27 PROCEDURE — 99213 OFFICE O/P EST LOW 20 MIN: CPT | Performed by: NURSE PRACTITIONER

## 2025-01-27 RX ORDER — NEEDLES, FILTER 19GX1 1/2"
NEEDLE, DISPOSABLE MISCELLANEOUS
COMMUNITY
Start: 2024-12-04

## 2025-01-27 RX ORDER — DIVALPROEX SODIUM 500 MG/1
TABLET, FILM COATED, EXTENDED RELEASE ORAL
COMMUNITY
Start: 2025-01-23

## 2025-01-27 RX ORDER — PALIPERIDONE 6 MG/1
2 TABLET, EXTENDED RELEASE ORAL DAILY
COMMUNITY
Start: 2024-12-13

## 2025-01-27 RX ORDER — PALIPERIDONE 3 MG/1
TABLET, EXTENDED RELEASE ORAL
COMMUNITY
Start: 2024-11-15

## 2025-01-27 RX ORDER — DIVALPROEX SODIUM 250 MG/1
250 TABLET, FILM COATED, EXTENDED RELEASE ORAL
COMMUNITY
Start: 2025-01-23

## 2025-01-27 RX ORDER — LACOSAMIDE 50 MG/1
50 TABLET ORAL 2 TIMES DAILY
COMMUNITY

## 2025-01-27 NOTE — PROGRESS NOTES
"Chief Complaint: \"Neck Pain and headaches.\"      History of Present Illness:   Patient: Ms. Leticia Farooq, 44 y.o. female originally referred by Dr. Sorenson in consultation for chronic intractable neck pain and occipital headaches.  Patient reports a longstanding history of neck pain associated with headaches for many years.  She has a history of ACDF at C5-C6 with plate cage and allograft by Dr. Tato Riley on 11/26/2020.  Unfortunately she continued to experience neck pain and headaches.  She has responded well to minimally invasive interventional pain management procedures.  We last saw her on July 29, 2024 when she underwent repeat bilateral cervical medial branch rhizotomies at C3, C4 and C5, from which she reports experiencing 70-75% pain relief and functional improvement lasting about 6 months.  Only recently has she began to experience recurrence of her symptoms, she notices this mostly with her headaches resurfacing.  She also reports about 60 to 70% relief in her occipital headaches thereafter rhizotomy.  She completed a comprehensive physical therapy program within the last year, and has continued an ongoing physical therapist directed home exercise program.  She tells me she underwent some biofeedback with a local psychologist in her area for her headaches.  She returns today for postprocedure follow-up and evaluation.  Pain Description: Constant pain with intermittent posterior neck and bilateral occipital pain, described as aching and sharp sensation.   Radiation of Pain: The pain radiates from the posterior cervical region into the occipital region and into the forehead causing headaches   Pain intensity today: 5/10  Average pain intensity last week: 5/10  Pain intensity ranges from: 5/10 to 8/10  Aggravating factors: Pain increases with extension, rotation of the cervical spine   Alleviating factors: Pain decreases with lying down with an ice-pack  Associated Symptoms:   Patient denies pain, " numbness, or weakness in the upper extremities.   Patient denies any new bladder or bowel problems.   Patient denies difficulties with her balance or recent falls.   Patient reports daily bilateral cervicogenic and occipital headaches lasting all day with less severity and intensity.  Pain interferes with regular activities, ADLs, and affects patient's quality of life   Pain does not interfere with sleep    Stiffness      Review of previous therapies and additional medical records:  Leticia Farooq has already failed the following measures, including:   Conservative Measures: Oral analgesics, opioids, topical analgesics, ice, heat, TENs, chiropractic therapy, massage, physical therapy   Interventional Measures: Cervical epidural steroid injections with Dr. CLINT Jansen (prior to surgery). Patient undergoes Botox therapy every 3 months for treatment of her migraines. Trigger point injections, IV infusions twice weekly  10/20/2021: Bilateral cervical RFA  06/06/2022: Bilateral cervical RFA  3/8/2023: Bilateral cervical RFA  12/13/2023: Bilateral cervical RFA  07/29/2024: Bilateral cervical RFA  Surgical Measures: On 11/16/2020: Anterior cervical discectomy and fusion C5-C6 with plate, cage and allograft by Dr. Tato Riley. No history of previous shoulder surgery   Leticia Farooq underwent orthopedic surgical consultation with Dr. Riley about and was found not to be a surgical candidate.  Leticia Farooq presents with significant comorbidities including migraines, anxiety and depression, obesity, chronic constipation.  Patient takes Eliquis for history of DVT associated with PICC line  In terms of current analgesics, Leticia Farooq takes: Propranolol, dihydroergotamine, Toradol, Keppra, Robaxin, cyclobenzaprine. Patient also takes diphenhydramine, Lamictal, ondansetron, paliperidone, prochlorperazine, promethazine, lithium  I have reviewed [facility] Report is consistent with medication reconciliation.  SOAPP: Not  completed by the patient    Global Pain Scale 08-31  2021 07-10  2024 01-27  2025        Pain 18 15 12        Feelings 12 12 5        Clinical outcomes 10 8 2        Activities 4 6 2        GPS Total: 44 41 21            NECK PAIN DISABILITY INDEX QUESTIONNAIRE  DATE 07-10  2024            Pain intensity  0: No pain  1: Mild  2: Moderate  3: Fairly severe  4: very severe  5: Worst imaginable 2            Personal Care   0: I can look after myself normally without causing extra pain.  1: I can look after myself normally, but it causes extra pain.  2: It is painful to look after myself and I am slow and careful.  3: I need some help, but manage most of my personal care.  4: I need help every day in most aspects of self-care.  5: I do not get dressed; I wash with difficulty and stay in bed. 3            Lifting  0: I can lift heavy weights without extra pain.  1: I can lift heavy weights, but it gives extra pain.  2: Pain prevents me from lifting heavy weights off the floor but I can manage if they are conveniently positioned, for example, on a table.  3: Pain prevents me from lifting heavy weights, but I can manage light to medium weights if they are conveniently positioned.  4: I can lift very light weights.  5: I cannot lift or carry anything at all. 4            Reading  0: I can read as much as I want to with no pain in my neck.  1: I can read as much as I want to with slight pain in my neck.  2: I can read as much as I want to with moderate pain in my neck.  3: I cannot read as much as I want because of moderate pain in my neck.  4: I cannot read as much as I want because of severe pain in my neck.  5: I cannot read at all. 2            Headaches  0: I have no headaches at all.  1: I have slight headaches which come infrequently.  2: I have moderate headaches which come infrequently.  3: I have moderate headaches which come frequently.  4: I have severe headaches which come frequently.  5: I have headaches almost  all the time. 5            Concentration  0: I can concentrate fully when I want to with no difficulty.  1: I can concentrate fully when I want to with slight difficulty.  2: I have a fair degree of difficulty in concentrating when I want to.  3: I have a lot of difficulty in concentrating when I want to.  4: I have a great deal of difficulty in concentrating when I want to.  5: I cannot concentrate at all. 3            Work  0: I can do as much work as I want to.  1: I can only do my usual work, but no more.  2: I can do most of my usual work, but no more.  3: I cannot do my usual work.  4: I can hardly do any work at all.  5: I cannot do any work at all. 2            Driving  0: I can drive my car without any neck pain.  1: I can drive my car as long as I want with slight pain in my neck.  2: I can drive my car as long as I want with moderate pain in my   neck.  3: I cannot drive my car as long as I want because of moderate pain   in my neck.  4: I can hardly drive at all because of severe pain in my neck.  5: I cannot drive my car at all. 2            Sleeping  0: I have no trouble sleeping.  1: My sleep is slightly disturbed (less than 1 hour sleepless).  2: My sleep is mildly disturbed (1-2 hours sleepless).  3: My sleep is moderately disturbed (2-3 hours sleepless).  4: My sleep is greatly disturbed (3-5 hours sleepless).  5: My sleep is completely disturbed (5-7 hours) 1            Recreation  0: I am able to engage in all of my recreational activities with no neck pain at all.  1: I am able to engage in all of my recreational activities with some pain in my neck.  2: I am able to engage in most, but not all of my recreational activities because of pain in my neck.  3: I am able to engage in a few of my recreational activities because of pain in my neck.  4: I can hardly do any recreational activities because of pain in my neck.  5: I cannot do any recreational activities at all. 2            TOTAL SCORE 26              Review of Diagnostic Studies:    Cervical spine x-rays with flexion-extension views 8/31/2021: Postsurgical change from prior ACDF at C5-C6 without hardware complication.  No instability.  MRI of the cervical spine without contrast on 7/16/2021: Imaging was reviewed.  Evidence of previous anterior fusion at C5-C6.  The craniocervical junction appears unremarkable.  C1-C2, C2-C3, C3-C4, C4-C5: No significant canal or foraminal stenosis  C5-C6: Metal artifact from anterior plate and screws.  Posterior disc spur complex results in mild canal stenosis and mild left neuroforaminal stenosis  C6-C7: Posterior lateral spurring results in mild left neuroforaminal stenosis  C7-T1: Posterior left for disc protrusion.  Mild left foraminal stenosis    Review of Systems   Constitutional:  Positive for fatigue.   Musculoskeletal:  Positive for neck pain and neck stiffness.   Neurological:  Positive for headaches.   Psychiatric/Behavioral:  The patient is nervous/anxious (depression).    All other systems reviewed and are negative.        Patient Active Problem List   Diagnosis    Cervical stenosis of spinal canal, s/p ACDF    Migraines    Anxiety and depression    Obesity    Postoperative pain    Bilateral occipital neuralgia    Cervical spondylosis without myelopathy    History of fusion of cervical spine    Intractable chronic migraine without aura and without status migrainosus    TMJ dysfunction    Morbid obesity with BMI of 40.0-44.9, adult    Bruxism       Past Medical History:   Diagnosis Date    Anxiety and depression     Constipation     Contact lens/glasses fitting     Migraines     PONV (postoperative nausea and vomiting)     Spinal headache          Past Surgical History:   Procedure Laterality Date    ANTERIOR CERVICAL DISCECTOMY W/ FUSION N/A 11/16/2020    Procedure: ANTERIOR CERVICAL DISCECTOMY AND FUSION C5-6 WITH PLATE, CAGE AND ALLOGRAFT;  Surgeon: Tato Riley MD;  Location: [facility];  Service:  "Orthopedic Spine;  Laterality: N/A;    CHOLECYSTECTOMY      TIBIA FRACTURE SURGERY      HARDWARE IN ANKLE         Family History   Problem Relation Age of Onset    Cancer Mother     Arthritis Father     Hypertension Father     Arthritis Brother          Social History     Socioeconomic History    Marital status:    Tobacco Use    Smoking status: Never    Smokeless tobacco: Never   Vaping Use    Vaping status: Never Used   Substance and Sexual Activity    Alcohol use: Never    Drug use: Never    Sexual activity: Defer          Current Outpatient Medications:     BD Integra Syringe 25G X 1\" 3 ML misc, INJECT INTO THE MUSCLE AS DIRECTED, Disp: , Rfl:     dihydroergotamine (DHE) 1 MG/ML injection, Infuse 1 mL into a venous catheter Every 8 (Eight) Hours As Needed for Migraine., Disp: , Rfl:     dihydroergotamine in sodium chloride 0.9 % 50 mL IVPB, Infuse 1 mg into a venous catheter As Needed., Disp: , Rfl:     diphenhydrAMINE (BENADRYL) 50 MG/ML injection, Inject 50 mg (1 mL) into the muscle every 12 hours as needed for headache. Limit 2 doses/day, 2 days/week. Be aware of sedation., Disp: , Rfl:     diphenhydrAMINE (BENADRYL), Infuse 50 mL into a venous catheter 2 (Two) Times a Week., Disp: , Rfl:     divalproex (DEPAKOTE ER) 250 MG 24 hr tablet, Take 1 tablet by mouth every night at bedtime., Disp: , Rfl:     divalproex (DEPAKOTE ER) 500 MG 24 hr tablet, TAKE 1 TABLET BY MOUTH EVERY DAY WITH 250MG TABLET, Disp: , Rfl:     ketorolac (TORADOL) 10 MG tablet, , Disp: , Rfl:     ketorolac (TORADOL) 30 MG/ML injection, INJECT 2 ML (60 MG) INTO THE MUSCLE TWICE A DAY AS NEEDED FOR SEVERE HEADACHE. LIMIT 10 DOSES/MONTH., Disp: , Rfl:     l-methylfolate 15 MG tablet tablet, Take 1 tablet by mouth Daily., Disp: , Rfl:     lacosamide (VIMPAT) 50 MG tablet tablet, Take 1 tablet by mouth 2 (Two) Times a Day., Disp: , Rfl:     lamoTRIgine (LaMICtal) 100 MG tablet, Take 1 tablet by mouth 2 (Two) Times a Day., Disp: , Rfl:    " " lamoTRIgine (LaMICtal) 150 MG tablet, Take 1 tablet by mouth Every 12 (Twelve) Hours., Disp: , Rfl:     lamoTRIgine (LaMICtal) 25 MG tablet, Take 1 tablet by mouth 2 (Two) Times a Day., Disp: , Rfl:     levETIRAcetam (KEPPRA) 250 MG tablet, , Disp: , Rfl:     lithium (ESKALITH) 450 MG CR tablet, Take 1 tablet by mouth 2 (Two) Times a Day., Disp: , Rfl:     methocarbamol (ROBAXIN) 500 MG tablet, , Disp: , Rfl:     omeprazole (priLOSEC) 40 MG capsule, Take 1 capsule by mouth Daily., Disp: , Rfl:     OnabotulinumtoxinA (BOTOX IJ), Inject 1 each as directed Every 3 (Three) Months., Disp: , Rfl:     ondansetron (ZOFRAN) 4 MG/5ML solution, Take 5 mL by mouth 2 (Two) Times a Week., Disp: , Rfl:     paliperidone (INVEGA) 3 MG 24 hr tablet, TAKE 1 TABLET BY MOUTH EVERY DAY WITH PALIPERIDONE 9MG, Disp: , Rfl:     paliperidone (INVEGA) 6 MG 24 hr tablet, Take 2 tablets by mouth Daily., Disp: , Rfl:     prochlorperazine (COMPAZINE) 10 MG tablet, Take 1 tablet by mouth Every 8 (Eight) Hours As Needed for Nausea or Vomiting (HA)., Disp: , Rfl:     prochlorperazine (COMPAZINE) 10 MG/2ML injection, Inject 10mg (2 mL) into the muscle once every 8-12 hours as needed for severe headache and/or nausea. Take with benadryl. Limit 2 doses/day and max 8 days/month. Do not take within 24 hours of oral compazine., Disp: , Rfl:     prochlorperazine (COMPAZINE) 10 MG/2ML injection, PLEASE SEE ATTACHED FOR DETAILED DIRECTIONS, Disp: , Rfl:     promethazine (PHENERGAN) 25 MG tablet, Take 1 tablet by mouth 2 (Two) Times a Day As Needed for Nausea or Vomiting., Disp: , Rfl:     promethazine (PHENERGAN), Infuse 50 mL into a venous catheter 2 (Two) Times a Week., Disp: , Rfl:     tiZANidine (ZANAFLEX) 4 MG tablet, Take 1 tablet by mouth every night at bedtime., Disp: , Rfl:     valproate in sodium chloride 0.9 % 100 mL IVPB, Infuse 500 mg into a venous catheter 2 (Two) Times a Week., Disp: , Rfl:       No Known Allergies      Ht 152.4 cm (60\")   " Wt 80.8 kg (178 lb 1.6 oz)   BMI 34.78 kg/m²           Physical Exam:   Constitutional: Patient appears well-developed, well-nourished, well-hydrated  HEENT: Head: Normocephalic and atraumatic  Eyes: Conjunctivae and lids are normal  Pupils: Equal, round, reactive to light  TMJ: Mandibular opening normal. Opening pattern abnormal with deviation toward the right. There is crepitus. Palpation of the joint in the ear canal while the patient opens and closes the mandible reveals abnormal condylar movement with tenderness.   Neck: Trachea normal. Neck supple. No JVD present.   Lymphatic: No cervical adenopathy  Musculoskeletal   Gait and station: Gait evaluation demonstrated a normal gait.    Cervical spine: Passive and active range of motion are limited secondary to pain. Extension, flexion, lateral flexion, rotation of the cervical spine increased and reproduced pain. Cervical facet joint loading maneuvers are positive. Palpation at the occipital and suboccipital regions reproduces pain.  Muscles: Presence of active trigger points: None today  Shoulders: The range of motion of the glenohumeral joints is full and without pain. Rotator cuff strength is 5/5.   Neurological:   Patient is alert and oriented to person, place, and time.   Speech: Normal.   Cortical function: Normal mental status.   Cranial nerves 2-12: intact.   Reflex Scores:  Right brachioradialis: 1+  Left brachioradialis: 1+  Right biceps: 1+  Left biceps: 1+  Right triceps: 1+  Left triceps: 1+  Right patellar: 0+  Left patellar: 0+  Right Achilles: 0+  Left Achilles: 0+  Motor strength: 5/5  Motor Tone: Normal .   Involuntary movements: None.   Superficial/Primitive Reflexes: Primitive reflexes were absent.   Right Franklin: Absent  Left Franklin: Absent  Right ankle clonus: Absent  Left ankle clonus: Absent   Babinsky: Absent  Spurling sign: Negative. Neck tornado test: Negative. Lhermitte sign: Negative. Long tract signs: Negative.   Sensory exam:  Intact to light touch, intact pain and temperature sensation, intact vibration sensation and normal proprioception.   Coordination: Normal finger to nose and heel to shin. Normal balance and negative Romberg's sign   Skin and subcutaneous tissue: Skin is warm and intact. No rash noted. No cyanosis.   Psychiatric: Judgment and insight: Normal. Recent and remote memory: Intact. Mood and affect: Normal.     ASSESSMENT:   1. Cervical spondylosis without myelopathy    2. History of fusion of cervical spine    3. Bilateral occipital neuralgia    4. Intractable chronic migraine without aura and without status migrainosus    5. Bruxism    6. TMJ dysfunction    7. Anxiety and depression              PLAN/MEDICAL DECISION MAKING: Ms. Leticia Farooq, 44 y.o. female reports a longstanding history of neck pain and headaches for several years.  She has a history of ACDF at C5-C6 with plate cage and allograft by Dr. Tato Riley on 11/26/2020.  Unfortunately she  continued to experience neck pain and headaches.   Her history is complicated by a longstanding history of migraine headaches, axial/mechanical cervicalgia, and occipital neuralgia.  Her history is quite complex.  She underwent orthopedic surgical consultation with Dr. Riley and was found not to be a surgical candidate. MRI of the cervical spine from July 2021 revealed evidence of previous ACDF C5-C6.  No significant canal or foraminal stenosis.  There is some facet and uncovertebral hypertrophy above the level of the cervical fusion. Patient has failed to obtain pain relief with conservative measures including oral analgesics, physical therapy, chiropractic therapy, to name a few.  Patient undergoes trigger point injections, IV infusions, to name a few about twice weekly.  She undergoes Botox therapy every 3 months.  She has responded remarkably well to cervical rhizotomy as referenced above.  She still does not feel that she is at the point of needing to repeat the  procedure.  Her headaches are slowly resurfacing, which is normally how her symptoms present.  She will keep me posted on her ongoing progress, if she feels she is in need to repeat RFA.  Patient has failed to obtain pain relief with conservative measures, interventional pain management measures, and previous surgical intervention, as referenced above. I have reviewed all available patient's medical records as well as previous therapies as referenced above. I had a lengthy conversation with Ms. Leticia Farooq regarding her chronic pain condition and potential therapeutic options including risks, benefits, alternative therapies, to name a few. Therefore, I have proposed the following plan:  1. Pharmacological measures: Reviewed and discussed; Patient takes Propranolol, dihydroergotamine, Toradol, Keppra, Robaxin. Patient also takes diphenhydramine, Lamictal, ondansetron, paliperidone, prochlorperazine, promethazine   2. Interventional pain management measures: None indicated at this time.  Patient will keep you posted on her ongoing progress, she is already a candidate to repeat bilateral cervical medial branch rhizotomies at C3, C4, and C5; for bilateral cervical facet joints at C3-C4 and C4-C5.  We have also discussed the possibility of diagnostic and therapeutic bilateral greater occipital nerve blocks by hydrodissection technique under ultrasound and fluoroscopic guidance.  Patient has been found not to be a surgical candidate.  She could be a candidate for a spinal cord stimulator trial with [employer].  An occipital nerve stimulator trial would be ideal for her chronic pain, unfortunately this is not usually covered by insurance.    3. Long-term rehabilitation efforts:  A. The patient does not have a history of falls. I did complete a risk assessment for falls  B. Patient will start a comprehensive physical therapy program for ultrasound, myofascial release, cupping and dry needling, posture/position body  mechanics, range of motion  C. Start an exercise program such as water therapy and daily walks for fitness  D. Contrast therapy: Apply ice-packs for 15-20 minutes, followed by heating pads for 15-20 minutes to affected area   E. In the case of advanced pain therapies she will need referral to Dr. Jay Mace for psychological screening for spinal cord stimulation and intrathecal therapies, cognitive behavioral therapy and biofeedback.  F. Continue efforts at weight loss  G. Continue mouth appliance/bite guard every day  H. She sees a therapist [facility] at [facility]  4. The patient has been instructed to contact my office with any questions or difficulties. The patient understands the plan and agrees to proceed accordingly.         Pain Medications               divalproex (DEPAKOTE ER) 250 MG 24 hr tablet Take 1 tablet by mouth every night at bedtime.    divalproex (DEPAKOTE ER) 500 MG 24 hr tablet TAKE 1 TABLET BY MOUTH EVERY DAY WITH 250MG TABLET    ketorolac (TORADOL) 10 MG tablet     ketorolac (TORADOL) 30 MG/ML injection INJECT 2 ML (60 MG) INTO THE MUSCLE TWICE A DAY AS NEEDED FOR SEVERE HEADACHE. LIMIT 10 DOSES/MONTH.    lacosamide (VIMPAT) 50 MG tablet tablet Take 1 tablet by mouth 2 (Two) Times a Day.    lamoTRIgine (LaMICtal) 100 MG tablet Take 1 tablet by mouth 2 (Two) Times a Day.    lamoTRIgine (LaMICtal) 150 MG tablet Take 1 tablet by mouth Every 12 (Twelve) Hours.    lamoTRIgine (LaMICtal) 25 MG tablet Take 1 tablet by mouth 2 (Two) Times a Day.    levETIRAcetam (KEPPRA) 250 MG tablet     lithium (ESKALITH) 450 MG CR tablet Take 1 tablet by mouth 2 (Two) Times a Day.    methocarbamol (ROBAXIN) 500 MG tablet     paliperidone (INVEGA) 3 MG 24 hr tablet TAKE 1 TABLET BY MOUTH EVERY DAY WITH PALIPERIDONE 9MG    paliperidone (INVEGA) 6 MG 24 hr tablet Take 2 tablets by mouth Daily.    prochlorperazine (COMPAZINE) 10 MG tablet Take 1 tablet by mouth Every 8 (Eight) Hours As Needed for Nausea or  Vomiting (HA).    prochlorperazine (COMPAZINE) 10 MG/2ML injection Inject 10mg (2 mL) into the muscle once every 8-12 hours as needed for severe headache and/or nausea. Take with benadryl. Limit 2 doses/day and max 8 days/month. Do not take within 24 hours of oral compazine.    prochlorperazine (COMPAZINE) 10 MG/2ML injection PLEASE SEE ATTACHED FOR DETAILED DIRECTIONS    tiZANidine (ZANAFLEX) 4 MG tablet Take 1 tablet by mouth every night at bedtime.    valproate in sodium chloride 0.9 % 100 mL IVPB Infuse 500 mg into a venous catheter 2 (Two) Times a Week.              Patient Care Team:  Tato Sapp MD as PCP - General (General Practice)  Soy Nevarez MD as Consulting Physician (Pain Medicine)  Serenity Tan APRN as Nurse Practitioner (Pain Medicine)     No orders of the defined types were placed in this encounter.        No future appointments.            RHONA Barrera

## 2025-04-24 ENCOUNTER — TELEPHONE (OUTPATIENT)
Dept: PAIN MEDICINE | Facility: CLINIC | Age: 45
End: 2025-04-24
Payer: COMMERCIAL

## 2025-04-24 DIAGNOSIS — M47.812 CERVICAL SPONDYLOSIS WITHOUT MYELOPATHY: Primary | ICD-10-CM

## 2025-05-14 ENCOUNTER — OUTSIDE FACILITY SERVICE (OUTPATIENT)
Dept: PAIN MEDICINE | Facility: CLINIC | Age: 45
End: 2025-05-14
Payer: COMMERCIAL

## 2025-05-14 PROCEDURE — 64634 DESTROY C/TH FACET JNT ADDL: CPT | Performed by: ANESTHESIOLOGY

## 2025-05-14 PROCEDURE — 64633 DESTROY CERV/THOR FACET JNT: CPT | Performed by: ANESTHESIOLOGY

## 2025-05-14 PROCEDURE — 99152 MOD SED SAME PHYS/QHP 5/>YRS: CPT | Performed by: ANESTHESIOLOGY

## 2025-05-16 ENCOUNTER — TELEPHONE (OUTPATIENT)
Dept: PAIN MEDICINE | Facility: CLINIC | Age: 45
End: 2025-05-16
Payer: COMMERCIAL

## 2025-05-16 NOTE — TELEPHONE ENCOUNTER
S/w patient and scheduled her to see Serenity for a follow up on 11/19/2025 at 1:00 PM at the [facility].     Closing TE.

## (undated) DEVICE — DIFFUSER: Brand: CORE, MAESTRO

## (undated) DEVICE — 4.0MM ROUND FLUTED AGGRESSIVE

## (undated) DEVICE — DRAIN JACKSON PRATT 10FR 7MM: Brand: CARDINAL HEALTH

## (undated) DEVICE — GLV SURG SIGNATURE TOUCH PF LTX 8 STRL BX/50

## (undated) DEVICE — GOWN,REINF,POLY,ECL,PP SLV,XL: Brand: MEDLINE

## (undated) DEVICE — EXTENSION TAB: Brand: DEROYAL

## (undated) DEVICE — KITTNER SPONGE: Brand: DEROYAL

## (undated) DEVICE — 3M™ STERI-DRAPE™ INSTRUMENT POUCH 1018: Brand: STERI-DRAPE™

## (undated) DEVICE — ELECTRD BLD EZ CLN MOD XLNG 2.75IN

## (undated) DEVICE — ANTIBACTERIAL UNDYED BRAIDED (POLYGLACTIN 910), SYNTHETIC ABSORBABLE SUTURE: Brand: COATED VICRYL

## (undated) DEVICE — GLV SURG SENSICARE PI MIC PF SZ8 LF STRL

## (undated) DEVICE — SKYLINE ANTERIOR CERVICAL PLATE SYSTEM DRILL 2.2 X 12MM: Brand: SKYLINE

## (undated) DEVICE — TB SXN FRAZIER 8F STRL

## (undated) DEVICE — PK SPINE ORTHO 10

## (undated) DEVICE — OIL CARTRIDGE: Brand: CORE, MAESTRO

## (undated) DEVICE — LO CONTOUR COLLAR: Brand: DEROYAL

## (undated) DEVICE — JACKSON-PRATT 100CC BULB RESERVOIR: Brand: CARDINAL HEALTH

## (undated) DEVICE — CVR HNDL LIGHT RIGID

## (undated) DEVICE — HEAD HALTER: Brand: DEROYAL

## (undated) DEVICE — 3M™ MEDIPORE™ H SOFT CLOTH SURGICAL TAPE, 2863, 3 IN X 10 YD, 12/CASE: Brand: 3M™ MEDIPORE™